# Patient Record
Sex: MALE | Race: AMERICAN INDIAN OR ALASKA NATIVE | NOT HISPANIC OR LATINO | Employment: OTHER | URBAN - METROPOLITAN AREA
[De-identification: names, ages, dates, MRNs, and addresses within clinical notes are randomized per-mention and may not be internally consistent; named-entity substitution may affect disease eponyms.]

---

## 2021-06-24 PROBLEM — N28.9 ACUTE ON CHRONIC RENAL INSUFFICIENCY: Status: ACTIVE | Noted: 2021-06-24

## 2021-06-24 PROBLEM — E11.9 TYPE 2 DIABETES MELLITUS (HCC): Status: ACTIVE | Noted: 2021-06-24

## 2021-06-24 PROBLEM — N18.9 ACUTE ON CHRONIC RENAL INSUFFICIENCY: Status: ACTIVE | Noted: 2021-06-24

## 2021-06-24 PROBLEM — I10 ESSENTIAL HYPERTENSION: Status: ACTIVE | Noted: 2021-06-24

## 2021-06-24 PROBLEM — I63.9 CVA (CEREBRAL VASCULAR ACCIDENT) (HCC): Status: ACTIVE | Noted: 2021-06-24

## 2021-06-28 PROBLEM — N18.9 ACUTE ON CHRONIC RENAL INSUFFICIENCY: Status: RESOLVED | Noted: 2021-06-24 | Resolved: 2021-06-28

## 2021-06-28 PROBLEM — I63.9 CVA (CEREBRAL VASCULAR ACCIDENT) (HCC): Status: RESOLVED | Noted: 2021-06-24 | Resolved: 2021-06-28

## 2021-06-28 PROBLEM — N28.9 ACUTE ON CHRONIC RENAL INSUFFICIENCY: Status: RESOLVED | Noted: 2021-06-24 | Resolved: 2021-06-28

## 2022-03-06 PROBLEM — N18.32 STAGE 3B CHRONIC KIDNEY DISEASE: Status: ACTIVE | Noted: 2022-03-06

## 2022-03-06 PROBLEM — R32 URINARY INCONTINENCE: Status: ACTIVE | Noted: 2022-03-06

## 2022-03-06 PROBLEM — D64.9 NORMOCYTIC ANEMIA: Status: ACTIVE | Noted: 2022-03-06

## 2022-03-06 PROBLEM — N17.9 ACUTE KIDNEY INJURY SUPERIMPOSED ON CKD (HCC): Status: ACTIVE | Noted: 2022-03-06

## 2022-03-06 PROBLEM — E87.29 HIGH ANION GAP METABOLIC ACIDOSIS: Status: ACTIVE | Noted: 2022-03-06

## 2022-03-06 PROBLEM — A41.9 SEPSIS (HCC): Status: ACTIVE | Noted: 2022-03-06

## 2022-03-06 PROBLEM — I69.30 HISTORY OF STROKE WITH RESIDUAL DEFICIT: Status: ACTIVE | Noted: 2022-03-06

## 2022-03-06 PROBLEM — R74.01 TRANSAMINITIS: Status: ACTIVE | Noted: 2022-03-06

## 2022-03-06 PROBLEM — N18.9 ACUTE KIDNEY INJURY SUPERIMPOSED ON CKD (HCC): Status: ACTIVE | Noted: 2022-03-06

## 2022-03-06 PROBLEM — E86.0 DEHYDRATION: Status: ACTIVE | Noted: 2022-03-06

## 2022-03-06 PROBLEM — N39.0 COMPLICATED URINARY TRACT INFECTION: Status: ACTIVE | Noted: 2022-03-06

## 2022-03-08 PROBLEM — N18.30 ANEMIA OF CHRONIC KIDNEY FAILURE, STAGE 3 (MODERATE): Status: ACTIVE | Noted: 2022-03-06

## 2022-03-08 PROBLEM — N18.32 TYPE 2 DIABETES MELLITUS WITH STAGE 3B CHRONIC KIDNEY DISEASE, WITHOUT LONG-TERM CURRENT USE OF INSULIN (HCC): Status: ACTIVE | Noted: 2021-06-24

## 2022-03-08 PROBLEM — N17.9 SEPSIS WITH ACUTE RENAL FAILURE WITHOUT SEPTIC SHOCK (HCC): Status: ACTIVE | Noted: 2022-03-06

## 2022-03-08 PROBLEM — D63.1 ANEMIA OF CHRONIC KIDNEY FAILURE, STAGE 3 (MODERATE): Status: ACTIVE | Noted: 2022-03-06

## 2022-03-08 PROBLEM — R65.20 SEPSIS WITH ACUTE RENAL FAILURE WITHOUT SEPTIC SHOCK (HCC): Status: ACTIVE | Noted: 2022-03-06

## 2022-03-08 PROBLEM — N12 PYELONEPHRITIS: Status: ACTIVE | Noted: 2022-03-06

## 2022-03-08 PROBLEM — N40.1 BENIGN LOCALIZED PROSTATIC HYPERPLASIA WITH LOWER URINARY TRACT SYMPTOMS (LUTS): Status: ACTIVE | Noted: 2022-03-06

## 2022-03-08 PROBLEM — E11.22 TYPE 2 DIABETES MELLITUS WITH STAGE 3B CHRONIC KIDNEY DISEASE, WITHOUT LONG-TERM CURRENT USE OF INSULIN (HCC): Status: ACTIVE | Noted: 2021-06-24

## 2022-03-11 PROBLEM — R74.01 TRANSAMINITIS: Status: RESOLVED | Noted: 2022-03-06 | Resolved: 2022-03-11

## 2022-03-11 PROBLEM — E87.29 HIGH ANION GAP METABOLIC ACIDOSIS: Status: RESOLVED | Noted: 2022-03-06 | Resolved: 2022-03-11

## 2022-03-11 PROBLEM — R65.20 SEPSIS WITH ACUTE RENAL FAILURE WITHOUT SEPTIC SHOCK (HCC): Status: RESOLVED | Noted: 2022-03-06 | Resolved: 2022-03-11

## 2022-03-11 PROBLEM — N12 PYELONEPHRITIS: Status: RESOLVED | Noted: 2022-03-06 | Resolved: 2022-03-11

## 2022-03-11 PROBLEM — A41.9 SEPSIS WITH ACUTE RENAL FAILURE WITHOUT SEPTIC SHOCK (HCC): Status: RESOLVED | Noted: 2022-03-06 | Resolved: 2022-03-11

## 2022-03-11 PROBLEM — N17.9 SEPSIS WITH ACUTE RENAL FAILURE WITHOUT SEPTIC SHOCK (HCC): Status: RESOLVED | Noted: 2022-03-06 | Resolved: 2022-03-11

## 2022-07-12 ENCOUNTER — PRE-ADMISSION TESTING (OUTPATIENT)
Dept: ADMISSIONS | Facility: MEDICAL CENTER | Age: 72
End: 2022-07-12
Attending: STUDENT IN AN ORGANIZED HEALTH CARE EDUCATION/TRAINING PROGRAM
Payer: MEDICARE

## 2022-07-12 DIAGNOSIS — Z01.812 PRE-OPERATIVE LABORATORY EXAMINATION: ICD-10-CM

## 2022-07-12 RX ORDER — CALCIUM ACETATE 667 MG/1
1334 CAPSULE ORAL DAILY
COMMUNITY
Start: 2022-06-17

## 2022-07-12 RX ORDER — PRAVASTATIN SODIUM 10 MG
40 TABLET ORAL DAILY
COMMUNITY
Start: 2022-06-16 | End: 2022-07-12

## 2022-07-12 RX ORDER — FUROSEMIDE 40 MG/1
40 TABLET ORAL DAILY
COMMUNITY
Start: 2022-05-25 | End: 2023-11-10

## 2022-07-12 RX ORDER — LOSARTAN POTASSIUM 50 MG/1
TABLET ORAL
COMMUNITY
Start: 2022-06-14 | End: 2022-07-12

## 2022-07-12 RX ORDER — BLOOD-GLUCOSE METER
KIT MISCELLANEOUS
COMMUNITY
Start: 2022-06-16 | End: 2023-11-10

## 2022-07-12 RX ORDER — MAGNESIUM OXIDE 400 MG/1
TABLET ORAL
COMMUNITY
Start: 2022-06-14 | End: 2023-11-10

## 2022-07-12 RX ORDER — ASPIRIN 81 MG/1
81 TABLET, CHEWABLE ORAL DAILY
Status: ON HOLD | COMMUNITY
Start: 2022-06-14 | End: 2023-11-11

## 2022-07-12 RX ORDER — INSULIN GLARGINE 100 [IU]/ML
10 INJECTION, SOLUTION SUBCUTANEOUS DAILY
COMMUNITY
Start: 2022-06-16 | End: 2023-11-10

## 2022-07-12 RX ORDER — ACETAMINOPHEN 160 MG
4000 TABLET,DISINTEGRATING ORAL DAILY
COMMUNITY
Start: 2022-06-14

## 2022-07-12 NOTE — OR NURSING
"Preadmit appointment: \" Preparing for your Procedure information\" sheet given to patient with verbal and written instructions. Patient instructed to continue prescribed medications through the day before surgery, instructed to take the following medications the day of surgery per anesthesia protocol: TYLENOL, PLENDIL, PROSCAR, SYTHROID, ANTIVERT, ZOFRAN.              Pt appt for labs 07/13/22 and written procedure instructions put in an envelope to give to him at this time along with the meds to take DOS. These are attached to the lab requisition sheet.    Fall Risk ordered and protocol initiated.  "

## 2022-07-13 ENCOUNTER — APPOINTMENT (OUTPATIENT)
Dept: ADMISSIONS | Facility: MEDICAL CENTER | Age: 72
End: 2022-07-13
Attending: STUDENT IN AN ORGANIZED HEALTH CARE EDUCATION/TRAINING PROGRAM
Payer: MEDICARE

## 2022-07-13 DIAGNOSIS — Z01.812 PRE-OPERATIVE LABORATORY EXAMINATION: ICD-10-CM

## 2022-07-13 LAB
ERYTHROCYTE [DISTWIDTH] IN BLOOD BY AUTOMATED COUNT: 42 FL (ref 35.9–50)
HCT VFR BLD AUTO: 32.5 % (ref 42–52)
HGB BLD-MCNC: 10.2 G/DL (ref 14–18)
INR PPP: 1.05 (ref 0.87–1.13)
MCH RBC QN AUTO: 30.4 PG (ref 27–33)
MCHC RBC AUTO-ENTMCNC: 31.4 G/DL (ref 33.7–35.3)
MCV RBC AUTO: 96.7 FL (ref 81.4–97.8)
PLATELET # BLD AUTO: 269 K/UL (ref 164–446)
PMV BLD AUTO: 9.6 FL (ref 9–12.9)
PROTHROMBIN TIME: 13.3 SEC (ref 12–14.6)
RBC # BLD AUTO: 3.36 M/UL (ref 4.7–6.1)
WBC # BLD AUTO: 9.8 K/UL (ref 4.8–10.8)

## 2022-07-13 PROCEDURE — 36415 COLL VENOUS BLD VENIPUNCTURE: CPT

## 2022-07-13 PROCEDURE — 85610 PROTHROMBIN TIME: CPT

## 2022-07-13 PROCEDURE — 85027 COMPLETE CBC AUTOMATED: CPT

## 2022-07-20 ENCOUNTER — APPOINTMENT (OUTPATIENT)
Dept: RADIOLOGY | Facility: MEDICAL CENTER | Age: 72
End: 2022-07-20
Attending: NURSE PRACTITIONER
Payer: MEDICARE

## 2022-07-20 ENCOUNTER — HOSPITAL ENCOUNTER (OUTPATIENT)
Facility: MEDICAL CENTER | Age: 72
End: 2022-07-20
Attending: STUDENT IN AN ORGANIZED HEALTH CARE EDUCATION/TRAINING PROGRAM | Admitting: STUDENT IN AN ORGANIZED HEALTH CARE EDUCATION/TRAINING PROGRAM
Payer: MEDICARE

## 2022-07-20 VITALS
RESPIRATION RATE: 16 BRPM | DIASTOLIC BLOOD PRESSURE: 75 MMHG | OXYGEN SATURATION: 97 % | WEIGHT: 199.52 LBS | BODY MASS INDEX: 29.46 KG/M2 | SYSTOLIC BLOOD PRESSURE: 147 MMHG | TEMPERATURE: 98.1 F | HEART RATE: 98 BPM

## 2022-07-20 DIAGNOSIS — N17.9 ACUTE RENAL FAILURE SUPERIMPOSED ON STAGE 4 CHRONIC KIDNEY DISEASE, UNSPECIFIED ACUTE RENAL FAILURE TYPE (HCC): ICD-10-CM

## 2022-07-20 DIAGNOSIS — N18.4 ACUTE RENAL FAILURE SUPERIMPOSED ON STAGE 4 CHRONIC KIDNEY DISEASE, UNSPECIFIED ACUTE RENAL FAILURE TYPE (HCC): ICD-10-CM

## 2022-07-20 LAB — CYTOLOGY REG CYTOL: NORMAL

## 2022-07-20 PROCEDURE — 700111 HCHG RX REV CODE 636 W/ 250 OVERRIDE (IP)

## 2022-07-20 PROCEDURE — 88346 IMFLUOR 1ST 1ANTB STAIN PX: CPT

## 2022-07-20 PROCEDURE — 88348 ELECTRON MICROSCOPY DX: CPT

## 2022-07-20 PROCEDURE — 4410208 CT-NEEDLE BX-RENAL

## 2022-07-20 PROCEDURE — 77012 CT SCAN FOR NEEDLE BIOPSY: CPT

## 2022-07-20 PROCEDURE — 700105 HCHG RX REV CODE 258: Performed by: RADIOLOGY

## 2022-07-20 PROCEDURE — 160002 HCHG RECOVERY MINUTES (STAT)

## 2022-07-20 PROCEDURE — 160046 HCHG PACU - 1ST 60 MINS PHASE II

## 2022-07-20 PROCEDURE — 160035 HCHG PACU - 1ST 60 MINS PHASE I

## 2022-07-20 PROCEDURE — 88313 SPECIAL STAINS GROUP 2: CPT | Mod: 91

## 2022-07-20 PROCEDURE — 88300 SURGICAL PATH GROSS: CPT

## 2022-07-20 PROCEDURE — 88305 TISSUE EXAM BY PATHOLOGIST: CPT

## 2022-07-20 PROCEDURE — 160036 HCHG PACU - EA ADDL 30 MINS PHASE I

## 2022-07-20 PROCEDURE — 88350 IMFLUOR EA ADDL 1ANTB STN PX: CPT | Mod: 91

## 2022-07-20 RX ORDER — MIDAZOLAM HYDROCHLORIDE 1 MG/ML
INJECTION INTRAMUSCULAR; INTRAVENOUS
Status: COMPLETED
Start: 2022-07-20 | End: 2022-07-20

## 2022-07-20 RX ORDER — LIDOCAINE HYDROCHLORIDE 10 MG/ML
INJECTION, SOLUTION INFILTRATION; PERINEURAL
Status: DISCONTINUED
Start: 2022-07-20 | End: 2022-07-20 | Stop reason: HOSPADM

## 2022-07-20 RX ORDER — ONDANSETRON 2 MG/ML
4 INJECTION INTRAMUSCULAR; INTRAVENOUS EVERY 6 HOURS PRN
Status: DISCONTINUED | OUTPATIENT
Start: 2022-07-20 | End: 2022-07-20 | Stop reason: HOSPADM

## 2022-07-20 RX ORDER — OXYCODONE HYDROCHLORIDE 5 MG/1
5 TABLET ORAL
Status: DISCONTINUED | OUTPATIENT
Start: 2022-07-20 | End: 2022-07-20 | Stop reason: HOSPADM

## 2022-07-20 RX ORDER — HYDROMORPHONE HYDROCHLORIDE 2 MG/ML
0.5 INJECTION, SOLUTION INTRAMUSCULAR; INTRAVENOUS; SUBCUTANEOUS
Status: DISCONTINUED | OUTPATIENT
Start: 2022-07-20 | End: 2022-07-20 | Stop reason: HOSPADM

## 2022-07-20 RX ORDER — SODIUM CHLORIDE 9 MG/ML
500 INJECTION, SOLUTION INTRAVENOUS
Status: DISCONTINUED | OUTPATIENT
Start: 2022-07-20 | End: 2022-07-20 | Stop reason: HOSPADM

## 2022-07-20 RX ORDER — SODIUM CHLORIDE 9 MG/ML
1000 INJECTION, SOLUTION INTRAVENOUS
Status: COMPLETED | OUTPATIENT
Start: 2022-07-20 | End: 2022-07-20

## 2022-07-20 RX ADMIN — FENTANYL CITRATE 50 MCG: 50 INJECTION, SOLUTION INTRAMUSCULAR; INTRAVENOUS at 14:39

## 2022-07-20 RX ADMIN — SODIUM CHLORIDE 1000 ML: 9 INJECTION, SOLUTION INTRAVENOUS at 13:07

## 2022-07-20 RX ADMIN — MIDAZOLAM HYDROCHLORIDE 1 MG: 1 INJECTION, SOLUTION INTRAMUSCULAR; INTRAVENOUS at 14:39

## 2022-07-20 ASSESSMENT — FIBROSIS 4 INDEX
FIB4 SCORE: 1.77
FIB4 SCORE: 1.77

## 2022-07-20 ASSESSMENT — PAIN DESCRIPTION - PAIN TYPE: TYPE: ACUTE PAIN

## 2022-07-20 NOTE — PROCEDURES
Patient underwent a CT guided left sided renal biopsy  by Dr. Rios. Procedure site was marked by MD and verified using imaging guidance.  Patient was placed in a prone position.  Vitals were taken every 5 minutes and remained stable during procedure (see doc flow sheet for results).  CO2 waveform capnography was monitored and remained consistent throughout procedure.  A bandaid dressing was placed over surgical site. Report called to PACU RN. Pt transported by timo with RN to PACU. Core Labs X 2 hand delivered to lab by Dr Armstrong, pathologist.

## 2022-07-20 NOTE — OR NURSING
1500: To PACU from IR via reshmarmarisol, pt awake, respirations spontaneous and non-labored. Pt denies nausea and pain. Biopsy site visualized, band-aid over area, c/d/i. Plan to keep for 2 hours per protocol for observation post procedure.  1515: No change in biopsy site. Pt resting, denies pain and nausea.  1530: No change.  1545: Pt resting, no change in biopsy site. Pt tolerating sips of water.  1600: No change.   1615: No change.  1630: No change in biopsy site, pt resting.  1645: No change.  1700: No change in biopsy site, pt denies pain and nausea. Meets criteria to transfer to Stage 2. Report called to Tete HEAD.

## 2022-07-20 NOTE — OR SURGEON
Immediate Post- Operative Note        Findings: renal insufficiency      Procedure(s): CT left kidney biopsy      Estimated Blood Loss: Less than 5 ml        Complications: None            7/20/2022     2:45 PM     Henry Rios M.D.

## 2022-07-20 NOTE — DISCHARGE INSTRUCTIONS
If any questions arise, call your provider.  If your provider is not available, please feel free to call the Surgical Center at [phone].    MEDICATIONS: Resume taking daily medication.  Take prescribed pain medication with food.  If no medication is prescribed, you may take non-aspirin pain medication if needed.  PAIN MEDICATION CAN BE VERY CONSTIPATING.  Take a stool softener or laxative such as senokot, pericolace, or milk of magnesia if needed.    What to Expect Post Anesthesia    Rest and take it easy for the first 24 hours.  A responsible adult is recommended to remain with you during that time.  It is normal to feel sleepy.  We encourage you to not do anything that requires balance, judgment or coordination.    FOR 24 HOURS DO NOT:  Drive, operate machinery or run household appliances.  Drink beer or alcoholic beverages.  Make important decisions or sign legal documents.    To avoid nausea, slowly advance diet as tolerated, avoiding spicy or greasy foods for the first day.  Add more substantial food to your diet according to your provider's instructions.  Babies can be fed formula or breast milk as soon as they are hungry.  INCREASE FLUIDS AND FIBER TO AVOID CONSTIPATION.    MILD FLU-LIKE SYMPTOMS ARE NORMAL.  YOU MAY EXPERIENCE GENERALIZED MUSCLE ACHES, THROAT IRRITATION, HEADACHE AND/OR SOME NAUSEA.    Percutaneous Kidney Biopsy    A kidney biopsy is a procedure to remove small pieces of tissue from a kidney. In a percutaneous biopsy, the tissue is removed using a needle that is inserted through the skin. This procedure is done so that the tissue can be examined under a microscope and checked for disease or infection.    What are the risks?  Generally, this is a safe procedure. However, problems may occur, including:  Infection.  Bleeding.  Allergic reactions to medicines.  Damage to other structures or organs.  Swelling from a collection of clotted blood outside a blood vessel (hematoma).  Blood in  the urine (hematuria).    What happens during the procedure?  To lower your risk of infection:  Your health care team will wash or sanitize their hands.  Your skin will be washed with soap.  An IV tube will be inserted into one of your veins.  You will be given one or more of the following:  A medicine to help you relax (sedative).  A medicine to numb the area (local anesthetic).  You will lie on your abdomen. A firm pillow will be placed under your body to help push the kidneys closer to the surface of the skin. If you have a transplanted kidney, you will lie on your back.  The health care provider will link the area where the needle will enter your skin.  An imaging test--such as an ultrasound, X-ray, CT scan, or MRI--will be used to locate the kidney. These images will also help the health care provider to guide the biopsy needle into the kidney.  You will be asked to hold your breath and stay still while the health care provider inserts the needle and removes the kidney tissue.  You will need to hold your breath and stay still for 30-45 seconds.  During the biopsy, you may hear a popping sound from the needle.  You may also feel some pressure from the area where the needle is being inserted.  The needle may be inserted and removed 3 or 4 times to make sure that enough tissue is taken for testing.  A bandage (dressing) may be placed over the spot where the needle entered your skin (biopsy site).  The procedure may vary among health care providers and hospitals.    What happens after the procedure?  Your blood pressure, heart rate, breathing rate, and blood oxygen level will be monitored until the medicines you were given have worn off.  You will need to lie on your back for 6-8 hours.  You may have some pain or soreness near the biopsy site.  You may have pink or cloudy urine from small amounts of blood. This is normal.  You may have grogginess or fatigue if you were given a sedative.  Do not drive for 24 hours  if you were given a sedative.  It is up to you to get the results of your procedure. Ask your health care provider, or the department performing the procedure, when your results will be ready.  This information is not intended to replace advice given to you by your health care provider. Make sure you discuss any questions you have with your health care provider.

## 2022-07-21 NOTE — OR NURSING
1700: Patient arrived to stage 2 after receiving report.     1705: Patient dressed with help of CNA. Now resting in recliner. Alert and oriented x4. Denies pain or nausea. Band aid in place over biopsy site. Band aid CDI. Patients wife brought into stage 2.    1720: Discharge instructions reviewed with patient and patients wife. Both verbalize understanding. PIV removed. All belongings returned to patient. Discharged home into care of responsible adult.

## 2023-03-16 ENCOUNTER — OFFICE VISIT (OUTPATIENT)
Dept: VASCULAR SURGERY | Facility: MEDICAL CENTER | Age: 73
End: 2023-03-16
Payer: MEDICARE

## 2023-03-16 VITALS
HEART RATE: 89 BPM | HEIGHT: 69 IN | DIASTOLIC BLOOD PRESSURE: 66 MMHG | SYSTOLIC BLOOD PRESSURE: 124 MMHG | WEIGHT: 191 LBS | TEMPERATURE: 99.2 F | OXYGEN SATURATION: 90 % | BODY MASS INDEX: 28.29 KG/M2

## 2023-03-16 DIAGNOSIS — N18.6 ESRD (END STAGE RENAL DISEASE) (HCC): ICD-10-CM

## 2023-03-16 PROCEDURE — 99203 OFFICE O/P NEW LOW 30 MIN: CPT | Performed by: SURGERY

## 2023-03-16 ASSESSMENT — FIBROSIS 4 INDEX: FIB4 SCORE: 1.12

## 2023-03-16 NOTE — H&P
Vascular Surgery            New Patient Consultation    Patient:Sai Lorenzo Jr.  MRN:[id number]  Primary care physician:Bernie Asher M.D.  Referring Provider: [facility]    Vascular Consultant: Rashad Dent MD    Date: 3/16/2023  _____________________________________________________    Chief Complaint:     Chief Complaint   Patient presents with    New Patient     fistla     Evaluate patient for new fistula creation    History of Present Illness:   Sai Lorenzo  is a 72 y.o. year old male who suffers from end-stage renal disease and is on maintenance hemodialysis.  The patient is in need of permanent access in the patient's room was referred for a natural fistula.  The patient is currently dialyzing through a PermCath in his right jugular vein.    Past Medical History:     Past Medical History:   Diagnosis Date    Anemia     Cataract     CVA (cerebral vascular accident) ([facility])     2009    Dental disorder     Full set dentures    Diabetes ([facility])     Fx     face jaw hand    Hashimoto's disease     Hashimoto's thyroiditis     High cholesterol     Hypertension     Macular degeneration     bilateral    Renal disorder     stage 3b     Past Surgical History:     Past Surgical History:   Procedure Laterality Date    CATARACT EXTRACTION WITH IOL      FACIAL FRACTURE ORIF      OTHER      facial reconstruction     Allergies:   No Known Allergies  Medications:     Outpatient Encounter Medications as of 3/16/2023   Medication Sig Dispense Refill    ASPIRIN LOW DOSE 81 MG EC tablet TAKE 1 TABLET BY MOUTH ONCE A DAY AS DIRECTED FOR CIRCULATION      calcium acetate (PHOS-LO) 667 MG Cap       Cholecalciferol (VITAMIN D3) 2000 UNIT Cap TAKE 2 CAPSULES BY MOUTH ONCE A DAY AS DIRECTED (ISIDORO POWERS--[facility])      furosemide (LASIX) 40 MG Tab Take 40 mg by mouth every day.      FREESTYLE LITE strip USE 1 TEST STRIP IN BLOOD GLUCOSE MONITOR 2 TO 3 TIMES A DAY AS DIRECTED TO  CHECK BLOOD SUGAR      LANTUS 100 UNIT/ML Solution Inject 10 Units under the skin every day.      magnesium oxide (MAG-OX) 400 MG Tab tablet TAKE 1 TABLET BY MOUTH AT BEDTIME AS DIRECTED FOR MAGNESIUM (RUSS POWERS[facility])      acetaminophen (TYLENOL) 500 MG Tab Take 2 Tablets by mouth 3 times a day as needed for Mild Pain.      finasteride (PROSCAR) 5 MG Tab Take 1 Tablet by mouth every day. 30 Tablet 0    ondansetron (ZOFRAN ODT) 4 MG TABLET DISPERSIBLE Take 1 Tablet by mouth every four hours as needed for Nausea (give PO if IV route is unavailable.). 30 Tablet 0    tamsulosin (FLOMAX) 0.4 MG capsule Take 1 Capsule by mouth 1/2 hour after breakfast. 30 Capsule 0    Multiple Vitamins-Minerals (PRESERVISION AREDS) Cap Take 1 Capsule by mouth 2 times a day.      felodipine ER (PLENDIL) 2.5 MG TABLET SR 24 HR Take 2.5 mg by mouth every day.      pravastatin (PRAVACHOL) 40 MG tablet Take 40 mg by mouth every evening.      levothyroxine (SYNTHROID) 75 MCG Tab Take 50 mcg by mouth Every morning on an empty stomach.      linagliptin (TRADJENTA) 5 MG Tab tablet Take 5 mg by mouth every day.      meclizine (ANTIVERT) 25 MG Tab Take 1 Tab by mouth 3 times a day as needed for Dizziness. 30 Tab 0     No facility-administered encounter medications on file as of 3/16/2023.     Social History:     Social History     Socioeconomic History    Marital status:      Spouse name: Not on file    Number of children: Not on file    Years of education: Not on file    Highest education level: Not on file   Occupational History    Not on file   Tobacco Use    Smoking status: Never    Smokeless tobacco: Former   Vaping Use    Vaping Use: Never used   Substance and Sexual Activity    Alcohol use: No    Drug use: No    Sexual activity: Not on file   Other Topics Concern    Not on file   Social History Narrative    Not on file     Social Determinants of Health     Financial Resource Strain: Not on file   Food  "Insecurity: Not on file   Transportation Needs: Not on file   Physical Activity: Not on file   Stress: Not on file   Social Connections: Not on file   Intimate Partner Violence: Not on file   Housing Stability: Not on file      Social History     Tobacco Use   Smoking Status Never   Smokeless Tobacco Former     Social History     Substance and Sexual Activity   Alcohol Use No     Social History     Substance and Sexual Activity   Drug Use No      Family History:     Family History   Problem Relation Age of Onset    Alcohol abuse Mother     Thyroid Mother     Alcohol abuse Father        Review of Systems:   Constitutional: Negative for fever or chills  HENT:   Negative for hearing loss or tinnitus    Eyes:    Negative for blurred vision or loss of vision  Respiratory:  Negative for cough or hemoptysis  Cardiac:  Negative for chest pain or palpitations  Vascular:  Negative for claudication, Negative for rest pain  Gastrointestinal: Negative for vomiting or abdominal pain     Negative for hematochezia or melena   Genitourinary: Negative for dysuria or hematuria   Musculoskeletal: Negative for myalgias or acute joint pain  Skin:   Negative for itching or rash  Neurological:  Negative for dizziness or headaches     Negative for speech disturbance     Negative for extremity weakness or paresthesias  Endo/Heme:  Negative for easy bruising or bleeding       Exam:   /66 (BP Location: Left arm, Patient Position: Sitting, BP Cuff Size: Adult)   Pulse 89   Temp 37.3 °C (99.2 °F) (Temporal)   Ht 1.753 m (5' 9\")   Wt 86.6 kg (191 lb)   SpO2 90%   BMI 28.21 kg/m²     Constitutional: Alert, oriented, no acute distress  HEENT:  Normocephalic and atraumatic, EOMI  Neck:   Supple, no JVD,   Cardiovascular: Regular rate and rhythm,   Pulmonary:  Good air entry bilaterally,    Abdominal:  Soft, non-tender, non-distended         Musculoskeletal: No tenderness, no deformity  Neurological:  CN II-XII grossly intact, no focal " deficits  Skin:   Skin is warm and dry. No rash noted.  Vascular exam: PermCath right jugular vein       Imaging:         Assessment and Plan:   -End-stage renal disease    Plan AV fistula left upper extremity possible right    I discussed the procedure in detail including the risk of steal and or thrombosis.  We discussed the challenges with dialysis access and the patient agrees to proceed.         _____________________________________________________  Rashad Dent MD  [facility]  [phone]  [address], [place] [address]

## 2023-03-20 ENCOUNTER — TELEPHONE (OUTPATIENT)
Dept: VASCULAR SURGERY | Facility: MEDICAL CENTER | Age: 73
End: 2023-03-20
Payer: MEDICARE

## 2023-03-20 NOTE — TELEPHONE ENCOUNTER
I called and left a message for patient to call back to schedule procedure w/ Dr. Dent.     3/24-I called and left a message for patient to call back to schedule procedure w/ Dr. Dent.

## 2023-03-27 ENCOUNTER — TELEPHONE (OUTPATIENT)
Dept: VASCULAR SURGERY | Facility: MEDICAL CENTER | Age: 73
End: 2023-03-27
Payer: MEDICARE

## 2023-03-27 NOTE — TELEPHONE ENCOUNTER
I called patient to schedule procedure w/ Dr. Dent. Patient stated that he would call me back to schedule.

## 2023-03-28 ENCOUNTER — TELEPHONE (OUTPATIENT)
Dept: VASCULAR SURGERY | Facility: MEDICAL CENTER | Age: 73
End: 2023-03-28
Payer: MEDICARE

## 2023-03-28 NOTE — TELEPHONE ENCOUNTER
I called and scheduled patient for procedure w/ Dr. Dent on 4/17. Patient is to check in @ 12:30 in the [email or web address] tower. Procedure time is @ 2:30 pm.

## 2023-03-30 ENCOUNTER — APPOINTMENT (OUTPATIENT)
Dept: ADMISSIONS | Facility: MEDICAL CENTER | Age: 73
End: 2023-03-30
Attending: SURGERY
Payer: MEDICARE

## 2023-04-03 ENCOUNTER — PRE-ADMISSION TESTING (OUTPATIENT)
Dept: ADMISSIONS | Facility: MEDICAL CENTER | Age: 73
End: 2023-04-03
Attending: SURGERY
Payer: MEDICARE

## 2023-04-03 RX ORDER — PREDNISOLONE ACETATE 10 MG/ML
SUSPENSION/ DROPS OPHTHALMIC
COMMUNITY
Start: 2023-03-31 | End: 2023-11-10

## 2023-04-03 RX ORDER — CALCITRIOL 0.5 UG/1
CAPSULE, LIQUID FILLED ORAL DAILY
COMMUNITY
Start: 2023-02-27 | End: 2023-11-10

## 2023-04-03 RX ORDER — SODIUM BICARBONATE 650 MG/1
TABLET ORAL
COMMUNITY
Start: 2023-03-15 | End: 2023-11-10

## 2023-04-03 RX ORDER — AMLODIPINE BESYLATE 5 MG/1
5 TABLET ORAL DAILY
COMMUNITY
Start: 2023-02-07 | End: 2023-11-10

## 2023-04-03 RX ORDER — APIXABAN 2.5 MG/1
2.5 TABLET, FILM COATED ORAL 2 TIMES DAILY
COMMUNITY
Start: 2023-02-07 | End: 2023-11-10

## 2023-04-14 ENCOUNTER — TELEPHONE (OUTPATIENT)
Dept: VASCULAR SURGERY | Facility: MEDICAL CENTER | Age: 73
End: 2023-04-14
Payer: MEDICARE

## 2023-04-14 NOTE — TELEPHONE ENCOUNTER
I called and confirmed upcoming procedure with Dr. Dent with patient. Patient is aware of location, date and check in time.

## 2023-04-17 ENCOUNTER — HOSPITAL ENCOUNTER (OUTPATIENT)
Facility: MEDICAL CENTER | Age: 73
End: 2023-04-17
Attending: SURGERY | Admitting: SURGERY
Payer: MEDICARE

## 2023-04-17 VITALS
HEART RATE: 94 BPM | DIASTOLIC BLOOD PRESSURE: 104 MMHG | OXYGEN SATURATION: 95 % | SYSTOLIC BLOOD PRESSURE: 178 MMHG | TEMPERATURE: 96.8 F | RESPIRATION RATE: 18 BRPM

## 2023-04-17 LAB
ANION GAP SERPL CALC-SCNC: 14 MMOL/L (ref 7–16)
BUN SERPL-MCNC: 65 MG/DL (ref 8–22)
CALCIUM SERPL-MCNC: 9.4 MG/DL (ref 8.5–10.5)
CHLORIDE SERPL-SCNC: 102 MMOL/L (ref 96–112)
CO2 SERPL-SCNC: 23 MMOL/L (ref 20–33)
CREAT SERPL-MCNC: 8.12 MG/DL (ref 0.5–1.4)
EKG IMPRESSION: NORMAL
ERYTHROCYTE [DISTWIDTH] IN BLOOD BY AUTOMATED COUNT: 45.8 FL (ref 35.9–50)
GFR SERPLBLD CREATININE-BSD FMLA CKD-EPI: 6 ML/MIN/1.73 M 2
GLUCOSE BLD STRIP.AUTO-MCNC: 153 MG/DL (ref 65–99)
GLUCOSE SERPL-MCNC: 169 MG/DL (ref 65–99)
HCT VFR BLD AUTO: 36.6 % (ref 42–52)
HGB BLD-MCNC: 12.1 G/DL (ref 14–18)
MCH RBC QN AUTO: 31.2 PG (ref 27–33)
MCHC RBC AUTO-ENTMCNC: 33.1 G/DL (ref 33.7–35.3)
MCV RBC AUTO: 94.3 FL (ref 81.4–97.8)
PLATELET # BLD AUTO: 211 K/UL (ref 164–446)
PMV BLD AUTO: 9.1 FL (ref 9–12.9)
POTASSIUM SERPL-SCNC: 4.8 MMOL/L (ref 3.6–5.5)
RBC # BLD AUTO: 3.88 M/UL (ref 4.7–6.1)
SODIUM SERPL-SCNC: 139 MMOL/L (ref 135–145)
WBC # BLD AUTO: 10.5 K/UL (ref 4.8–10.8)

## 2023-04-17 PROCEDURE — 93005 ELECTROCARDIOGRAM TRACING: CPT | Performed by: SURGERY

## 2023-04-17 PROCEDURE — 85027 COMPLETE CBC AUTOMATED: CPT

## 2023-04-17 PROCEDURE — 80048 BASIC METABOLIC PNL TOTAL CA: CPT

## 2023-04-17 PROCEDURE — 93010 ELECTROCARDIOGRAM REPORT: CPT | Performed by: INTERNAL MEDICINE

## 2023-04-17 PROCEDURE — 82962 GLUCOSE BLOOD TEST: CPT

## 2023-04-17 PROCEDURE — 36415 COLL VENOUS BLD VENIPUNCTURE: CPT

## 2023-04-17 RX ORDER — SODIUM CHLORIDE, SODIUM LACTATE, POTASSIUM CHLORIDE, CALCIUM CHLORIDE 600; 310; 30; 20 MG/100ML; MG/100ML; MG/100ML; MG/100ML
INJECTION, SOLUTION INTRAVENOUS CONTINUOUS
Status: DISCONTINUED | OUTPATIENT
Start: 2023-04-17 | End: 2023-04-17 | Stop reason: HOSPADM

## 2023-04-18 ENCOUNTER — TELEPHONE (OUTPATIENT)
Dept: VASCULAR SURGERY | Facility: MEDICAL CENTER | Age: 73
End: 2023-04-18
Payer: MEDICARE

## 2023-04-18 NOTE — TELEPHONE ENCOUNTER
I called and rescheduled patient for procedure with Dr. Dent on 6/05 in the [facility] tower. I confirmed location, date and time with patient and his spouse.     5/01-I called and rescheduled patient for procedure with Dr. Dent to 5/10 @ 1:00 pm. Patient is to check in @ 11:00 am in the [facility] tower.     Updated orders faxed to the OR.     No auth required per Medicaid website.

## 2023-04-21 ENCOUNTER — APPOINTMENT (OUTPATIENT)
Dept: ADMISSIONS | Facility: MEDICAL CENTER | Age: 73
End: 2023-04-21
Attending: SURGERY
Payer: MEDICARE

## 2023-04-28 ENCOUNTER — PRE-ADMISSION TESTING (OUTPATIENT)
Dept: ADMISSIONS | Facility: MEDICAL CENTER | Age: 73
End: 2023-04-28
Attending: SURGERY
Payer: MEDICARE

## 2023-04-28 NOTE — OR NURSING
At PAT appointment done over the phone patient unaware of medications and dosages.  He stated he would get them from his dialysis team and bring a list to pre admission when he visits his wife who is in the hospital.

## 2023-05-01 NOTE — OR NURSING
Call made to patient.  Patient reports that he will bring medication list to preadmit department tomorrow when he visits his wife in the hospital.

## 2023-05-03 NOTE — OR NURSING
Call made to patient.  Patient had friend Wm give list of medications to this nurse.  Gave instructions to Wm.

## 2023-05-05 ENCOUNTER — TELEPHONE (OUTPATIENT)
Dept: VASCULAR SURGERY | Facility: MEDICAL CENTER | Age: 73
End: 2023-05-05
Payer: MEDICARE

## 2023-05-05 NOTE — TELEPHONE ENCOUNTER
I called patient and left a message to confirm date, time and location for procedure with Dr. Dent on 5/10.

## 2023-05-10 ENCOUNTER — ANESTHESIA (OUTPATIENT)
Dept: SURGERY | Facility: MEDICAL CENTER | Age: 73
End: 2023-05-10
Payer: MEDICARE

## 2023-05-10 ENCOUNTER — HOSPITAL ENCOUNTER (OUTPATIENT)
Facility: MEDICAL CENTER | Age: 73
End: 2023-05-10
Attending: SURGERY | Admitting: SURGERY
Payer: MEDICARE

## 2023-05-10 ENCOUNTER — ANESTHESIA EVENT (OUTPATIENT)
Dept: SURGERY | Facility: MEDICAL CENTER | Age: 73
End: 2023-05-10
Payer: MEDICARE

## 2023-05-10 VITALS
TEMPERATURE: 97.2 F | HEIGHT: 69 IN | DIASTOLIC BLOOD PRESSURE: 65 MMHG | OXYGEN SATURATION: 95 % | WEIGHT: 181.66 LBS | HEART RATE: 88 BPM | SYSTOLIC BLOOD PRESSURE: 124 MMHG | RESPIRATION RATE: 12 BRPM | BODY MASS INDEX: 26.91 KG/M2

## 2023-05-10 DIAGNOSIS — G89.18 POST-OP PAIN: ICD-10-CM

## 2023-05-10 LAB
BUN BLD-MCNC: 25 MG/DL (ref 8–22)
CA-I BLD ISE-SCNC: 1.07 MMOL/L (ref 1.1–1.3)
CHLORIDE BLD-SCNC: 95 MMOL/L (ref 96–112)
CO2 BLD-SCNC: 33 MMOL/L (ref 20–33)
CREAT BLD-MCNC: 5.3 MG/DL (ref 0.5–1.4)
GLUCOSE BLD STRIP.AUTO-MCNC: 139 MG/DL (ref 65–99)
GLUCOSE BLD STRIP.AUTO-MCNC: 149 MG/DL (ref 65–99)
GLUCOSE BLD-MCNC: 141 MG/DL (ref 65–99)
POTASSIUM BLD-SCNC: 4 MMOL/L (ref 3.6–5.5)
POTASSIUM SERPL-SCNC: 4 MMOL/L (ref 3.6–5.5)
SODIUM BLD-SCNC: 139 MMOL/L (ref 135–145)

## 2023-05-10 PROCEDURE — 160009 HCHG ANES TIME/MIN: Performed by: SURGERY

## 2023-05-10 PROCEDURE — 160046 HCHG PACU - 1ST 60 MINS PHASE II: Performed by: SURGERY

## 2023-05-10 PROCEDURE — 160002 HCHG RECOVERY MINUTES (STAT): Performed by: SURGERY

## 2023-05-10 PROCEDURE — 700102 HCHG RX REV CODE 250 W/ 637 OVERRIDE(OP): Mod: UD | Performed by: ANESTHESIOLOGY

## 2023-05-10 PROCEDURE — 160048 HCHG OR STATISTICAL LEVEL 1-5: Performed by: SURGERY

## 2023-05-10 PROCEDURE — 160028 HCHG SURGERY MINUTES - 1ST 30 MINS LEVEL 3: Performed by: SURGERY

## 2023-05-10 PROCEDURE — 36415 COLL VENOUS BLD VENIPUNCTURE: CPT

## 2023-05-10 PROCEDURE — 80047 BASIC METABLC PNL IONIZED CA: CPT

## 2023-05-10 PROCEDURE — 160035 HCHG PACU - 1ST 60 MINS PHASE I: Performed by: SURGERY

## 2023-05-10 PROCEDURE — 84132 ASSAY OF SERUM POTASSIUM: CPT

## 2023-05-10 PROCEDURE — 700111 HCHG RX REV CODE 636 W/ 250 OVERRIDE (IP): Mod: UD | Performed by: ANESTHESIOLOGY

## 2023-05-10 PROCEDURE — 160025 RECOVERY II MINUTES (STATS): Performed by: SURGERY

## 2023-05-10 PROCEDURE — 700105 HCHG RX REV CODE 258: Mod: UD | Performed by: SURGERY

## 2023-05-10 PROCEDURE — 36821 AV FUSION DIRECT ANY SITE: CPT | Mod: AS | Performed by: NURSE PRACTITIONER

## 2023-05-10 PROCEDURE — A9270 NON-COVERED ITEM OR SERVICE: HCPCS | Mod: UD | Performed by: ANESTHESIOLOGY

## 2023-05-10 PROCEDURE — 160039 HCHG SURGERY MINUTES - EA ADDL 1 MIN LEVEL 3: Performed by: SURGERY

## 2023-05-10 PROCEDURE — 700111 HCHG RX REV CODE 636 W/ 250 OVERRIDE (IP): Mod: UD | Performed by: SURGERY

## 2023-05-10 PROCEDURE — 82962 GLUCOSE BLOOD TEST: CPT

## 2023-05-10 PROCEDURE — 700101 HCHG RX REV CODE 250: Mod: UD | Performed by: ANESTHESIOLOGY

## 2023-05-10 PROCEDURE — 36821 AV FUSION DIRECT ANY SITE: CPT | Performed by: SURGERY

## 2023-05-10 PROCEDURE — 01844 ANES VASC SHUNT/SHUNT REVJ: CPT | Performed by: ANESTHESIOLOGY

## 2023-05-10 PROCEDURE — 99100 ANES PT EXTEME AGE<1 YR&>70: CPT | Performed by: ANESTHESIOLOGY

## 2023-05-10 RX ORDER — METOPROLOL TARTRATE 1 MG/ML
1 INJECTION, SOLUTION INTRAVENOUS
Status: DISCONTINUED | OUTPATIENT
Start: 2023-05-10 | End: 2023-05-10 | Stop reason: HOSPADM

## 2023-05-10 RX ORDER — CEFAZOLIN SODIUM 1 G/3ML
INJECTION, POWDER, FOR SOLUTION INTRAMUSCULAR; INTRAVENOUS PRN
Status: DISCONTINUED | OUTPATIENT
Start: 2023-05-10 | End: 2023-05-10 | Stop reason: SURG

## 2023-05-10 RX ORDER — IPRATROPIUM BROMIDE AND ALBUTEROL SULFATE 2.5; .5 MG/3ML; MG/3ML
3 SOLUTION RESPIRATORY (INHALATION)
Status: DISCONTINUED | OUTPATIENT
Start: 2023-05-10 | End: 2023-05-10 | Stop reason: HOSPADM

## 2023-05-10 RX ORDER — HYDROCODONE BITARTRATE AND ACETAMINOPHEN 5; 325 MG/1; MG/1
1-2 TABLET ORAL EVERY 4 HOURS PRN
Qty: 10 TABLET | Refills: 0 | Status: SHIPPED | OUTPATIENT
Start: 2023-05-10 | End: 2023-05-12

## 2023-05-10 RX ORDER — SODIUM CHLORIDE, SODIUM LACTATE, POTASSIUM CHLORIDE, CALCIUM CHLORIDE 600; 310; 30; 20 MG/100ML; MG/100ML; MG/100ML; MG/100ML
INJECTION, SOLUTION INTRAVENOUS CONTINUOUS
Status: DISCONTINUED | OUTPATIENT
Start: 2023-05-10 | End: 2023-05-10 | Stop reason: HOSPADM

## 2023-05-10 RX ORDER — HEPARIN SODIUM 1000 [USP'U]/ML
INJECTION, SOLUTION INTRAVENOUS; SUBCUTANEOUS PRN
Status: DISCONTINUED | OUTPATIENT
Start: 2023-05-10 | End: 2023-05-10 | Stop reason: SURG

## 2023-05-10 RX ORDER — VASOPRESSIN 20 U/ML
INJECTION PARENTERAL PRN
Status: DISCONTINUED | OUTPATIENT
Start: 2023-05-10 | End: 2023-05-10 | Stop reason: SURG

## 2023-05-10 RX ORDER — HYDROMORPHONE HYDROCHLORIDE 1 MG/ML
0.4 INJECTION, SOLUTION INTRAMUSCULAR; INTRAVENOUS; SUBCUTANEOUS
Status: DISCONTINUED | OUTPATIENT
Start: 2023-05-10 | End: 2023-05-10 | Stop reason: HOSPADM

## 2023-05-10 RX ORDER — OXYCODONE HYDROCHLORIDE AND ACETAMINOPHEN 5; 325 MG/1; MG/1
1 TABLET ORAL
Status: COMPLETED | OUTPATIENT
Start: 2023-05-10 | End: 2023-05-10

## 2023-05-10 RX ORDER — PHENYLEPHRINE HCL IN 0.9% NACL 0.5 MG/5ML
SYRINGE (ML) INTRAVENOUS PRN
Status: DISCONTINUED | OUTPATIENT
Start: 2023-05-10 | End: 2023-05-10 | Stop reason: SURG

## 2023-05-10 RX ORDER — ONDANSETRON 2 MG/ML
4 INJECTION INTRAMUSCULAR; INTRAVENOUS
Status: DISCONTINUED | OUTPATIENT
Start: 2023-05-10 | End: 2023-05-10 | Stop reason: HOSPADM

## 2023-05-10 RX ORDER — DIPHENHYDRAMINE HYDROCHLORIDE 50 MG/ML
12.5 INJECTION INTRAMUSCULAR; INTRAVENOUS
Status: DISCONTINUED | OUTPATIENT
Start: 2023-05-10 | End: 2023-05-10 | Stop reason: HOSPADM

## 2023-05-10 RX ORDER — HYDRALAZINE HYDROCHLORIDE 20 MG/ML
5 INJECTION INTRAMUSCULAR; INTRAVENOUS
Status: DISCONTINUED | OUTPATIENT
Start: 2023-05-10 | End: 2023-05-10 | Stop reason: HOSPADM

## 2023-05-10 RX ORDER — HYDROMORPHONE HYDROCHLORIDE 1 MG/ML
0.1 INJECTION, SOLUTION INTRAMUSCULAR; INTRAVENOUS; SUBCUTANEOUS
Status: DISCONTINUED | OUTPATIENT
Start: 2023-05-10 | End: 2023-05-10 | Stop reason: HOSPADM

## 2023-05-10 RX ORDER — HYDROMORPHONE HYDROCHLORIDE 1 MG/ML
0.2 INJECTION, SOLUTION INTRAMUSCULAR; INTRAVENOUS; SUBCUTANEOUS
Status: DISCONTINUED | OUTPATIENT
Start: 2023-05-10 | End: 2023-05-10 | Stop reason: HOSPADM

## 2023-05-10 RX ORDER — HALOPERIDOL 5 MG/ML
1 INJECTION INTRAMUSCULAR
Status: DISCONTINUED | OUTPATIENT
Start: 2023-05-10 | End: 2023-05-10 | Stop reason: HOSPADM

## 2023-05-10 RX ORDER — SODIUM CHLORIDE 9 MG/ML
INJECTION, SOLUTION INTRAVENOUS ONCE
Status: COMPLETED | OUTPATIENT
Start: 2023-05-10 | End: 2023-05-10

## 2023-05-10 RX ORDER — OXYCODONE HYDROCHLORIDE AND ACETAMINOPHEN 5; 325 MG/1; MG/1
2 TABLET ORAL
Status: COMPLETED | OUTPATIENT
Start: 2023-05-10 | End: 2023-05-10

## 2023-05-10 RX ORDER — LIDOCAINE HYDROCHLORIDE 20 MG/ML
INJECTION, SOLUTION EPIDURAL; INFILTRATION; INTRACAUDAL; PERINEURAL PRN
Status: DISCONTINUED | OUTPATIENT
Start: 2023-05-10 | End: 2023-05-10 | Stop reason: SURG

## 2023-05-10 RX ADMIN — FENTANYL CITRATE 25 MCG: 50 INJECTION INTRAMUSCULAR; INTRAVENOUS at 12:13

## 2023-05-10 RX ADMIN — OXYCODONE AND ACETAMINOPHEN 1 TABLET: 5; 325 TABLET ORAL at 13:48

## 2023-05-10 RX ADMIN — Medication 100 MCG: at 12:23

## 2023-05-10 RX ADMIN — HEPARIN SODIUM 3000 UNITS: 1000 INJECTION, SOLUTION INTRAVENOUS; SUBCUTANEOUS at 12:32

## 2023-05-10 RX ADMIN — CEFAZOLIN 2 G: 1 INJECTION, POWDER, FOR SOLUTION INTRAMUSCULAR; INTRAVENOUS at 12:15

## 2023-05-10 RX ADMIN — PROPOFOL 130 MG: 10 INJECTION, EMULSION INTRAVENOUS at 12:15

## 2023-05-10 RX ADMIN — LIDOCAINE HYDROCHLORIDE 80 MG: 20 INJECTION, SOLUTION EPIDURAL; INFILTRATION; INTRACAUDAL at 12:15

## 2023-05-10 RX ADMIN — FENTANYL CITRATE 25 MCG: 50 INJECTION INTRAMUSCULAR; INTRAVENOUS at 12:23

## 2023-05-10 RX ADMIN — Medication 200 MCG: at 12:33

## 2023-05-10 RX ADMIN — Medication 200 MCG: at 12:20

## 2023-05-10 RX ADMIN — PROPOFOL 40 MG: 10 INJECTION, EMULSION INTRAVENOUS at 12:31

## 2023-05-10 RX ADMIN — SODIUM CHLORIDE: 9 INJECTION, SOLUTION INTRAVENOUS at 12:08

## 2023-05-10 RX ADMIN — PROPOFOL 30 MG: 10 INJECTION, EMULSION INTRAVENOUS at 12:50

## 2023-05-10 RX ADMIN — Medication 200 MCG: at 12:28

## 2023-05-10 RX ADMIN — PROPOFOL 30 MG: 10 INJECTION, EMULSION INTRAVENOUS at 12:23

## 2023-05-10 RX ADMIN — VASOPRESSIN 2 UNITS: 20 INJECTION INTRAVENOUS at 12:36

## 2023-05-10 ASSESSMENT — PAIN SCALES - GENERAL: PAIN_LEVEL: 0

## 2023-05-10 ASSESSMENT — FIBROSIS 4 INDEX: FIB4 SCORE: 1.29

## 2023-05-10 ASSESSMENT — PAIN DESCRIPTION - PAIN TYPE
TYPE: SURGICAL PAIN

## 2023-05-10 NOTE — ANESTHESIA PREPROCEDURE EVALUATION
Case: [id number] Date/Time: 05/10/23 1245    Procedure: LEFT UPPER EXTREMITY ARTERIOVENOUS FISTULA CREATION, POSSIBLE RIGHT    Pre-op diagnosis: END STAGE RENAL DISEASE    Location: [facility] OR 09 / SURGERY [facility]    Surgeons: Rashad Dent M.D.        Past Medical History:   Diagnosis Date   • Anemia    • Cataract     iol bilat   • CVA (cerebral vascular accident) ([facility])     2009,2015- left sided weakness   • Dental disorder     Full set dentures   • Diabetes ([facility])    • Dialysis patient ([facility])    • Fx     face jaw hand   • Hashimoto's disease    • Hashimoto's thyroiditis    • High cholesterol    • Hypertension    • Macular degeneration     bilateral   • Renal disorder     stage 3b     Past Surgical History:   Procedure Laterality Date   • CATARACT EXTRACTION WITH IOL     • FACIAL FRACTURE ORIF     • OTHER      facial reconstruction     Current Outpatient Medications   Medication Instructions   • amLODIPine (NORVASC) 5 mg, DAILY   • ASPIRIN LOW DOSE 81 MG EC tablet TAKE 1 TABLET BY MOUTH ONCE A DAY AS DIRECTED FOR CIRCULATION   • calcitRIOL (ROCALTROL) 0.5 MCG Cap DAILY   • calcium acetate (PHOS-LO) 667 MG Cap No dose, route, or frequency recorded.   • Cholecalciferol (VITAMIN D3) 2000 UNIT Cap TAKE 2 CAPSULES BY MOUTH ONCE A DAY AS DIRECTED (ISIDORO POWERS[facility])   • Eliquis 2.5 mg, 2 TIMES DAILY   • felodipine ER (PLENDIL) 2.5 mg, DAILY   • finasteride (PROSCAR) 5 mg, Oral, DAILY   • FREESTYLE LITE strip USE 1 TEST STRIP IN BLOOD GLUCOSE MONITOR 2 TO 3 TIMES A DAY AS DIRECTED TO CHECK BLOOD SUGAR   • furosemide (LASIX) 40 mg, DAILY   • Lantus 10 Units, Subcutaneous, DAILY   • levothyroxine (SYNTHROID) 75 mcg, Oral, EACH MORNING ON EMPTY STOMACH   • linagliptin (TRADJENTA) 5 mg, Oral, DAILY   • magnesium oxide (MAG-OX) 400 MG Tab tablet    • meclizine (ANTIVERT) 25 mg, Oral, 3 TIMES DAILY PRN   • Multiple Vitamins-Minerals (PRESERVISION AREDS) Cap 1 Capsule, Oral, 2 TIMES DAILY   •  ondansetron (ZOFRAN ODT) 4 mg, Oral, EVERY 4 HOURS PRN   • pravastatin (PRAVACHOL) 40 mg, Oral, NIGHTLY   • prednisoLONE acetate (PRED FORTE) 1 % Suspension PLACE 1 DROP IN AFFECTED EYE(S) 4 TIMES A DAY AS DIRECTED   • sodium bicarbonate (SODIUM BICARBONATE) 650 MG Tab TAKE 1 TABLET BY MOUTH 2 TIMES A DAY AS DIRECTED (PRIYA MARTIN--[facility])   • tamsulosin (FLOMAX) 0.4 mg, Oral, AFTER BREAKFAST     Lab Results   Component Value Date/Time    SODIUM 139 04/17/2023 01:35 PM    POTASSIUM 4.8 04/17/2023 01:35 PM    CHLORIDE 102 04/17/2023 01:35 PM    CO2 23 04/17/2023 01:35 PM    GLUCOSE 169 (H) 04/17/2023 01:35 PM    BUN 65 (H) 04/17/2023 01:35 PM    CREATININE 8.12 (HH) 04/17/2023 01:35 PM    GLOMRATE 5 (L) 11/30/2022 07:05 AM      Lab Results   Component Value Date/Time    WBC 10.5 04/17/2023 01:35 PM    RBC 3.88 (L) 04/17/2023 01:35 PM    HEMOGLOBIN 12.1 (L) 04/17/2023 01:35 PM    HEMATOCRIT 36.6 (L) 04/17/2023 01:35 PM    MCV 94.3 04/17/2023 01:35 PM    MCH 31.2 04/17/2023 01:35 PM    MCHC 33.1 (L) 04/17/2023 01:35 PM    MPV 9.1 04/17/2023 01:35 PM    NEUTSPOLYS 59.2 11/28/2022 03:59 AM    NEUTSPOLYS 70 03/08/2022 04:30 AM    LYMPHOCYTES 22.8 11/28/2022 03:59 AM    LYMPHOCYTES 15.0 (L) 03/08/2022 04:30 AM    MONOCYTES 15.2 (H) 11/28/2022 03:59 AM    MONOCYTES 11.0 (H) 03/08/2022 04:30 AM    EOSINOPHILS 2.1 11/28/2022 03:59 AM    EOSINOPHILS 4.0 03/08/2022 04:30 AM    BASOPHILS 0.7 11/28/2022 03:59 AM          TTE 11/2022:  Interpretation Summary   Left ventricular systolic function is low normal.   The Ejection Fraction estimate is 50-55%   There is mild anterior and anterolateral hypokinesis. Mid to distal inferior and inferolateral   hypokinesis.   The right ventricle is normal size.   The right ventricular systolic function is normal.   The left atrium is moderately dilated.   There is mild mitral stenosis   Right ventricular systolic pressure is elevated at 55-60 mmHg   The  aortic root is mildly dilated   Small anteriorly accumulated pericardial effusion       Relevant Problems   CARDIAC   (positive) Essential hypertension         (positive) Acute kidney injury superimposed on CKD (HCC)   (positive) Anemia of chronic kidney failure, stage 3 (moderate) (HCC)   (positive) Stage 3b chronic kidney disease (HCC)      ENDO   (positive) Type 2 diabetes mellitus with stage 3b chronic kidney disease, without long-term current use of insulin (HCC)     Patient reports CVA residual effects including balance problems and mild L-sided weakness    Physical Exam    Airway   Mallampati: II  TM distance: >3 FB  Neck ROM: full       Cardiovascular - normal exam  Rhythm: regular  Rate: normal  (-) murmur     Dental   (+) lower dentures, upper dentures           Pulmonary - normal exam  (+) decreased breath sounds     Abdominal    Neurological - normal exam                 Anesthesia Plan    ASA 3   ASA physical status 3 criteria: ESRD undergoing regularly scheduled dialysis    Plan - general       Airway plan will be LMA          Induction: intravenous    Postoperative Plan: Postoperative administration of opioids is intended.    Pertinent diagnostic labs and testing reviewed    Informed Consent:    Anesthetic plan and risks discussed with patient.

## 2023-05-10 NOTE — ANESTHESIA TIME REPORT
Anesthesia Start and Stop Event Times     Date Time Event    5/10/2023 1152 Ready for Procedure     1208 Anesthesia Start     1303 Anesthesia Stop        Responsible Staff  05/10/23    Name Role Begin End    Laci Vaz M.D. Anesth 1208 1303        Overtime Reason:  no overtime (within assigned shift)    Comments:

## 2023-05-10 NOTE — ANESTHESIA PROCEDURE NOTES
Airway    Date/Time: 5/10/2023 12:16 PM    Performed by: Laci Vaz M.D.  Authorized by: Laci Vaz M.D.    Location:  OR  Urgency:  Elective  Indications for Airway Management:  Anesthesia      Spontaneous Ventilation: absent    Sedation Level:  Deep  Preoxygenated: Yes    Final Airway Type:  Supraglottic airway  Final Supraglottic Airway:  Standard LMA    SGA Size:  4  Number of Attempts at Approach:  1

## 2023-05-10 NOTE — OP REPORT
VASCULAR SURGERY SERVICE  Operative Note  __________________________________________________________    Date:  5/10/2023    Patient: Sai Lorenzo Jr.  :  1950  MRN:  [id number]  __________________________________________________________    Preoperative Diagnosis:  - End-stage renal disease    Postoperative Diagnosis:  - End-stage renal disease    Procedure:  - Creation of a left brachiobasilic AV fistula, first stage (63316)  __________________________________________________________    Surgeon:                                 Rashad Dent MD    Assistant:   Mandy POWERS    The nature of the surgical procedure warranted additional skilled operative assistance from an Advanced Registered Nurse Practitioner (ARNP). The assistant was present during the entire operation. The surgical assistant performed the following provided assistance with optimal surgical exposure of the operative field and provided high complexity, subspecialty decision making input.     Anesthesia:                             General plus local anesthetic    EBL:                                        minimal     Complications:                        none    Disposition:                             Tolerated well, sent to recovery in stable condition    Justification for use of Surgical First Assist:  An experienced first assistant was utilized during this operation due to the complexity of the operation.  My assistant participated with patient preparation for surgery, incision, surgical exposure including retraction, dissection, and ligation to isolate the target structures and preserve nearby structures, and closure of the field of dissection.  The presence of the expert assist increased the efficiency of the operation and decreased the risk of intraoperative surgical complications.    __________________________________________________________       DESCRIPTION OF PROCEDURE:  Following informed consent, patient was placed supine on  the operating table and general anesthesia was administered. Patient was prepped and draped in the usual sterile fashion.  Surgical time-out was called and correct.  Patient received preoperative antibiotics.       Patient was taken to the operating suite placed in the supine position.  Patient's left upper extremity was circumferentially prepped and draped in sterile fashion.  Transverse incision was made just distal to the antecubital crease.  The patient cephalic vein at this location was noted to be occluded but the basilic vein was adequate for for stage basilic vein transposition.  The brachial artery was dissected from the surrounding tissue and isolated.  The basilic vein was also isolated from the surrounding tissue transected distally.  3000 units of heparin was administered and a longitudinal arteriotomy was made in the brachial artery after clamping the vessel proximally and distally.  The vein was spatulated and end-to-side anastomosis was completed between the basilic vein and the brachial artery using 6-0 Prolene sutures in a running fashion.  Just prior to completing the anastomosis all vessels were flushed and the anastomosis was complete and flow was established.  3-0 Vicryl subcutaneous sutures were placed followed by 4-0 STRATAFIX suture benzoin Steri-Strips sterile dressings were applied.  Patient will return in 2 weeks for second stage.       All counts were correct.  Patient tolerated the procedure well and was sent to recovery in stable condition.     ____________________________________________________    Rashad Dent MD  [facility]

## 2023-05-10 NOTE — ANESTHESIA POSTPROCEDURE EVALUATION
Patient: Sai Lorenzo Jr.    Procedure Summary     Date: 05/10/23 Room / Location: [facility] 09 / SURGERY [facility]    Anesthesia Start: 1208 Anesthesia Stop: 1303    Procedure: LEFT UPPER EXTREMITY ARTERIOVENOUS FISTULA CREATION, POSSIBLE RIGHT (Left: Arm Lower) Diagnosis: (END STAGE RENAL DISEASE)    Surgeons: Rashad Dent M.D. Responsible Provider: Laci Vaz M.D.    Anesthesia Type: general ASA Status: 3          Final Anesthesia Type: general  Last vitals  BP   Blood Pressure : 101/58    Temp   36.2 °C (97.2 °F)    Pulse   72   Resp   13    SpO2   100 %      Anesthesia Post Evaluation    Patient location during evaluation: PACU  Patient participation: complete - patient participated  Level of consciousness: sleepy but conscious  Pain score: 0    Airway patency: patent  Anesthetic complications: no  Cardiovascular status: hemodynamically stable  Respiratory status: acceptable  Hydration status: balanced    PONV: none          There were no known notable events for this encounter.     Nurse Pain Score: 0 (NPRS)

## 2023-05-10 NOTE — H&P
Vascular Surgery            New Patient Consultation     Patient:Sai Lorenzo Jr.  MRN:[id number]  Primary care physician:Bernie Asher M.D.  Referring Provider: [facility]     Vascular Consultant: Rashad Dent MD     Date: 5-10-23  _____________________________________________________     Chief Complaint:           Chief Complaint   Patient presents with    New Patient       fistla      Evaluate patient for new fistula creation     History of Present Illness:   Sai Lorenzo  is a 72 y.o. year old male who suffers from end-stage renal disease and is on maintenance hemodialysis.  The patient is in need of permanent access in the patient's room was referred for a natural fistula.  The patient is currently dialyzing through a PermCath in his right jugular vein.     Past Medical History:      Past Medical History        Past Medical History:   Diagnosis Date    Anemia      Cataract      CVA (cerebral vascular accident) ([facility])       2009    Dental disorder       Full set dentures    Diabetes ([facility])      Fx       face jaw hand    Hashimoto's disease      Hashimoto's thyroiditis      High cholesterol      Hypertension      Macular degeneration       bilateral    Renal disorder       stage 3b         Past Surgical History:      Past Surgical History         Past Surgical History:   Procedure Laterality Date    CATARACT EXTRACTION WITH IOL        FACIAL FRACTURE ORIF        OTHER         facial reconstruction         Allergies:   No Known Allergies  Medications:      Encounter Medications          Outpatient Encounter Medications as of 3/16/2023   Medication Sig Dispense Refill    ASPIRIN LOW DOSE 81 MG EC tablet TAKE 1 TABLET BY MOUTH ONCE A DAY AS DIRECTED FOR CIRCULATION        calcium acetate (PHOS-LO) 667 MG Cap          Cholecalciferol (VITAMIN D3) 2000 UNIT Cap TAKE 2 CAPSULES BY MOUTH ONCE A DAY AS DIRECTED (ISIDORO POWERS--[facility])        furosemide (LASIX) 40 MG Tab Take 40 mg  by mouth every day.        FREESTYLE LITE strip USE 1 TEST STRIP IN BLOOD GLUCOSE MONITOR 2 TO 3 TIMES A DAY AS DIRECTED TO CHECK BLOOD SUGAR        LANTUS 100 UNIT/ML Solution Inject 10 Units under the skin every day.        magnesium oxide (MAG-OX) 400 MG Tab tablet TAKE 1 TABLET BY MOUTH AT BEDTIME AS DIRECTED FOR MAGNESIUM ([place] POWERS--[facility])        acetaminophen (TYLENOL) 500 MG Tab Take 2 Tablets by mouth 3 times a day as needed for Mild Pain.        finasteride (PROSCAR) 5 MG Tab Take 1 Tablet by mouth every day. 30 Tablet 0    ondansetron (ZOFRAN ODT) 4 MG TABLET DISPERSIBLE Take 1 Tablet by mouth every four hours as needed for Nausea (give PO if IV route is unavailable.). 30 Tablet 0    tamsulosin (FLOMAX) 0.4 MG capsule Take 1 Capsule by mouth 1/2 hour after breakfast. 30 Capsule 0    Multiple Vitamins-Minerals (PRESERVISION AREDS) Cap Take 1 Capsule by mouth 2 times a day.        felodipine ER (PLENDIL) 2.5 MG TABLET SR 24 HR Take 2.5 mg by mouth every day.        pravastatin (PRAVACHOL) 40 MG tablet Take 40 mg by mouth every evening.        levothyroxine (SYNTHROID) 75 MCG Tab Take 50 mcg by mouth Every morning on an empty stomach.        linagliptin (TRADJENTA) 5 MG Tab tablet Take 5 mg by mouth every day.        meclizine (ANTIVERT) 25 MG Tab Take 1 Tab by mouth 3 times a day as needed for Dizziness. 30 Tab 0      No facility-administered encounter medications on file as of 3/16/2023.         Social History:      Social History               Socioeconomic History    Marital status:        Spouse name: Not on file    Number of children: Not on file    Years of education: Not on file    Highest education level: Not on file   Occupational History    Not on file   Tobacco Use    Smoking status: Never    Smokeless tobacco: Former   Vaping Use    Vaping Use: Never used   Substance and Sexual Activity    Alcohol use: No    Drug use: No    Sexual activity: Not on file   Other Topics  Concern    Not on file   Social History Narrative    Not on file      Social Determinants of Health      Financial Resource Strain: Not on file   Food Insecurity: Not on file   Transportation Needs: Not on file   Physical Activity: Not on file   Stress: Not on file   Social Connections: Not on file   Intimate Partner Violence: Not on file   Housing Stability: Not on file         Social History          Tobacco Use   Smoking Status Never   Smokeless Tobacco Former      Social History          Substance and Sexual Activity   Alcohol Use No      Social History          Substance and Sexual Activity   Drug Use No      Family History:      Family History         Family History   Problem Relation Age of Onset    Alcohol abuse Mother      Thyroid Mother      Alcohol abuse Father              Review of Systems:   Constitutional:          Negative for fever or chills  HENT:                         Negative for hearing loss or tinnitus    Eyes:                           Negative for blurred vision or loss of vision  Respiratory:               Negative for cough or hemoptysis  Cardiac:                      Negative for chest pain or palpitations  Vascular:                    Negative for claudication, Negative for rest pain  Gastrointestinal:       Negative for vomiting or abdominal pain                                      Negative for hematochezia or melena   Genitourinary:           Negative for dysuria or hematuria   Musculoskeletal:       Negative for myalgias or acute joint pain  Skin:                           Negative for itching or rash  Neurological:             Negative for dizziness or headaches                                      Negative for speech disturbance                                      Negative for extremity weakness or paresthesias  Endo/Heme:               Negative for easy bruising or bleeding         Exam:   /66 (BP Location: Left arm, Patient Position: Sitting, BP Cuff Size: Adult)   Pulse  "89   Temp 37.3 °C (99.2 °F) (Temporal)   Ht 1.753 m (5' 9\")   Wt 86.6 kg (191 lb)   SpO2 90%   BMI 28.21 kg/m²      Constitutional:          Alert, oriented, no acute distress  HEENT:                       Normocephalic and atraumatic, EOMI  Neck:                          Supple, no JVD,   Cardiovascular:         Regular rate and rhythm,   Pulmonary:                Good air entry bilaterally,    Abdominal:                Soft, non-tender, non-distended                                          Musculoskeletal:       No tenderness, no deformity  Neurological:             CN II-XII grossly intact, no focal deficits  Skin:                           Skin is warm and dry. No rash noted.  Vascular exam:         PermCath right jugular vein            Imaging:            Assessment and Plan:   -End-stage renal disease     Plan AV fistula left upper extremity possible right     I discussed the procedure in detail including the risk of steal and or thrombosis.  We discussed the challenges with dialysis access and the patient agrees to proceed.           _____________________________________________________  Rashad Dent MD  [facility]  [phone]  [address], [place] [address]  "

## 2023-05-10 NOTE — OR NURSING
Pt's VSS; denies N/V; states pain is at tolerable level. Dressing CDI to left upper arm. D/c orders received. IV dc'd. Pt changed into clothing with assistance. Pt up and ambulated, steady gait. Discharge instructions given as well as pain management handout; pt and family verbalized understanding and questions answered. Patient states ready to d/c home. Prescriptions e-sent to preferred pharmacy. Pt dc'd in w/c with RN in stable condition.

## 2023-05-10 NOTE — OR NURSING
1405 patient recovered well in PACU, medicated per EMAR for pain, dressing to left upper arm with steri-strips, gauze and tegaderm; clean/dry/intact throughout PACU stay, patient denies nausea, tolerates PO fluids, telephone updates to sister, report called to phase II RN, Dr. Dent and Adele Dent APRN notified of need to change home medication to [facility] in [place], request complete per APRN. Patient agrees/asks questions regarding care. Safety ensured, care transferred.

## 2023-05-10 NOTE — DISCHARGE INSTRUCTIONS
AV Fistula D/C instructions:    * Remove ace wrap 2 hours post-procedure if present.    1. DIET: Upon discharge from the hospital you may resume your normal preoperative diet. Depending on how you are feeling and whether you have nausea or not, you may wish to stay with a bland diet for the first few days. However, you can advance this as quickly as you feel ready.    2. ACTIVITIES: After discharge from the hospital, you may resume full routine activities. However, there should be no heavy lifting (greater than 15 pounds) and no strenuous activities until after your follow-up visit. Otherwise, routine activities of daily living are acceptable.    3. DRIVING: You may drive whenever you are off pain medications and are able to perform the activities needed to drive, i.e. turning, bending, twisting, etc.    4. BATHING: You may get the wound wet at any time after leaving the hospital. You may shower, but do not submerge in a bath for at least a week. Dressings may come off after 48 hours. Leave steri strips on until they fall off.  It may be necessary to trim the edges.     5. BOWEL FUNCTION: Constipation is common after an operation, especially with pain medications. The combination of pain medication and decreased activity level can cause constipation in otherwise normal patients. If you feel this is occurring, take a laxative (Milk of Magnesia, Ex-Lax, Senokot, etc.) until the problem has resolved.    6. PAIN MEDICATION: You will be given a prescription for pain medication at discharge. Please take these as directed. It is important to remember not to take medications on an empty stomach as this may cause nausea.    7.CALL IF YOU HAVE: (1) Fevers to more than 1010 F, (2) Unusual chest or leg pain, (3) Drainage or fluid from incision that may be foul smelling, increased tenderness or soreness at the wound or the wound edges are no longer together, redness or swelling at the incision site. Please do not hesitate to  call with any other questions.     8. APPOINTMENT: Follow up [facility] as instructed.       HOME CARE INSTRUCTIONS    ACTIVITY: Rest and take it easy for the first 24 hours.  A responsible adult is recommended to remain with you during that time.  It is normal to feel sleepy.  We encourage you to not do anything that requires balance, judgment or coordination.    FOR 24 HOURS DO NOT:  Drive, operate machinery or run household appliances.  Drink beer or alcoholic beverages.  Make important decisions or sign legal documents.    SPECIAL INSTRUCTIONS:   AV Fistula Placement, Care After  This sheet gives you information about how to care for yourself after your procedure. Your health care provider may also give you more specific instructions. If you have problems or questions, contact your health care provider.  What can I expect after the procedure?  After the procedure, it is common to:  Feel sore.  Feel a vibration (thrill) over the fistula.  Follow these instructions at home:  Incision care  Follow instructions from your health care provider about how to take care of your incision. Make sure you:  Wash your hands with soap and water before and after you change your bandage (dressing). If soap and water are not available, use hand .  Change your dressing as told by your health care provider.  Leave stitches (sutures), skin glue, or adhesive strips in place. These skin closures may need to stay in place for 2 weeks or longer. If adhesive strip edges start to loosen and curl up, you may trim the loose edges. Do not remove adhesive strips completely unless your health care provider tells you to do that.  Fistula care  Check your fistula site every day to make sure the thrill feels the same.  Check your fistula site every day for signs of infection. Check for:  Redness, swelling, or pain.  Fluid or blood.  Warmth.  Pus or a bad smell.  Raise (elevate) the affected area above the level of your  heart while you are sitting or lying down.  Do not lift anything that is heavier than 10 lb (4.5 kg), or the limit that you are told, until your health care provider says that it is safe.  Do not lie down on your fistula arm.  Do not let anyone draw blood or take a blood pressure reading on your fistula arm. This is important.  Do not wear tight jewelry or clothing over your fistula arm.  Bathing  Do not take baths, swim, or use a hot tub until your health care provider approves. Ask your health care provider if you may take showers. You may only be allowed to take sponge baths.  Keep the area around your incision clean and dry.  Medicines  Take over-the-counter and prescription medicines only as told by your health care provider.  Ask your health care provider if any medicine prescribed to you can cause constipation. You may need to take steps to prevent or treat constipation, such as:  Drink enough fluid to keep your urine pale yellow.  Take over-the-counter or prescription medicines.  Eat foods that are high in fiber, such as beans, whole grains, and fresh fruits and vegetables.  Limit foods that are high in fat and processed sugars, such as fried or sweet foods.  General instructions  Rest at home for a day or two.  Return to your normal activities as told by your health care provider. Ask your health care provider what activities are safe for you.  Keep all follow-up visits as told by your health care provider. This is important.  Contact a health care provider if:  You have redness, swelling, or pain around your fistula site.  Your fistula site feels warm to the touch.  You have pus or a bad smell coming from your fistula site.  You have a fever.  You have numbness or coldness at your fistula site.  You feel a decrease or a change in the thrill.  Get help right away if you:  Are bleeding from your fistula site.  Have chest pain.  Have trouble breathing.  Summary  Follow instructions from your health care  provider about how to take care of your incision.  Do not let anyone draw blood or take a blood pressure reading on your fistula arm. This is important.  Return to your normal activities as told by your health care provider. Ask your health care provider what activities are safe for you.  Contact a health care provider if you have a change in the thrill or have any signs of infection at your fistula site.  Keep all follow-up visits as told by your health care provider. This is important.  This information is not intended to replace advice given to you by your health care provider. Make sure you discuss any questions you have with your health care provider.  Document Released: 12/18/2006 Document Revised: 06/24/2019 Document Reviewed: 06/24/2019  [employer] Patient Education © 2020 [employer]      DIET: To avoid nausea, slowly advance diet as tolerated, avoiding spicy or greasy foods for the first day.  Add more substantial food to your diet according to your physician's instructions.  Babies can be fed formula or breast milk as soon as they are hungry.  INCREASE FLUIDS AND FIBER TO AVOID CONSTIPATION.    SURGICAL DRESSING/BATHING: You may get the wound wet at any time after leaving the hospital. You may shower, but do not submerge in a bath for at least a week. Dressings may come off after 48 hours. Leave steri strips on until they fall off.  It may be necessary to trim the edges.     MEDICATIONS: Resume taking daily medication.  Take prescribed pain medication with food.  If no medication is prescribed, you may take non-aspirin pain medication if needed.  PAIN MEDICATION CAN BE VERY CONSTIPATING.  Take a stool softener or laxative such as senokot, pericolace, or milk of magnesia if needed.    Prescription given for Norco   Last pain medication given at 1:48 PM.    A follow-up appointment should be arranged with your doctor in 1-2 weeks ; call to schedule.    You should CALL YOUR PHYSICIAN if you develop:  Fever  greater than 101 degrees F.  Pain not relieved by medication, or persistent nausea or vomiting.  Excessive bleeding (blood soaking through dressing) or unexpected drainage from the wound.  Extreme redness or swelling around the incision site, drainage of pus or foul smelling drainage.  Inability to urinate or empty your bladder within 8 hours.  Problems with breathing or chest pain.    You should call 911 if you develop problems with breathing or chest pain.  If you are unable to contact your doctor or surgical center, you should go to the nearest emergency room or urgent care center.  Physician's telephone #: Dr Dent [phone]    MILD FLU-LIKE SYMPTOMS ARE NORMAL.  YOU MAY EXPERIENCE GENERALIZED MUSCLE ACHES, THROAT IRRITATION, HEADACHE AND/OR SOME NAUSEA.    If any questions arise, call your doctor.  If your doctor is not available, please feel free to call the Surgical Center at [phone].  The Center is open Monday through Friday from 7AM to 7PM.      A registered nurse may call you a few days after your surgery to see how you are doing after your procedure.    You may also receive a survey in the mail within the next two weeks and we ask that you take a few moments to complete the survey and return it to us.  Our goal is to provide you with very good care and we value your comments.     Depression / Suicide Risk    As you are discharged from this [facility] facility, it is important to learn how to keep safe from harming yourself.    Recognize the warning signs:  Abrupt changes in personality, positive or negative- including increase in energy   Giving away possessions  Change in eating patterns- significant weight changes-  positive or negative  Change in sleeping patterns- unable to sleep or sleeping all the time   Unwillingness or inability to communicate  Depression  Unusual sadness, discouragement and loneliness  Talk of wanting to die  Neglect of personal appearance   Rebelliousness- reckless  behavior  Withdrawal from people/activities they love  Confusion- inability to concentrate     If you or a loved one observes any of these behaviors or has concerns about self-harm, here's what you can do:  Talk about it- your feelings and reasons for harming yourself  Remove any means that you might use to hurt yourself (examples: pills, rope, extension cords, firearm)  Get professional help from the community (Mental Health, Substance Abuse, psychological counseling)  Do not be alone:Call your Safe Contact- someone whom you trust who will be there for you.  Call your local CRISIS HOTLINE 642-8325 or 463-088-3296  Call your local Children's Mobile Crisis Response Team [place] (093) 857-4820 or www.[employer]  Call the toll free National Suicide Prevention Hotlines   National Suicide Prevention Lifeline 203-393-SBYA (8176)  National Hope Line Network 800-SUICIDE (496-0652)    I acknowledge receipt and understanding of these Home Care instructions.

## 2023-07-03 ENCOUNTER — TELEPHONE (OUTPATIENT)
Dept: VASCULAR SURGERY | Facility: MEDICAL CENTER | Age: 73
End: 2023-07-03
Payer: MEDICARE

## 2023-11-10 ENCOUNTER — APPOINTMENT (OUTPATIENT)
Dept: CARDIOLOGY | Facility: MEDICAL CENTER | Age: 73
DRG: 321 | End: 2023-11-10
Attending: INTERNAL MEDICINE
Payer: MEDICARE

## 2023-11-10 ENCOUNTER — HOSPITAL ENCOUNTER (INPATIENT)
Facility: MEDICAL CENTER | Age: 73
LOS: 1 days | DRG: 321 | End: 2023-11-11
Attending: EMERGENCY MEDICINE | Admitting: INTERNAL MEDICINE
Payer: MEDICARE

## 2023-11-10 DIAGNOSIS — I21.4 NSTEMI (NON-ST ELEVATED MYOCARDIAL INFARCTION) (HCC): ICD-10-CM

## 2023-11-10 LAB
ACT BLD: 217 SEC (ref 74–137)
ACT BLD: 244 SEC (ref 74–137)
ALBUMIN SERPL BCP-MCNC: 4.1 G/DL (ref 3.2–4.9)
ALBUMIN/GLOB SERPL: 1.2 G/DL
ALP SERPL-CCNC: 98 U/L (ref 30–99)
ALT SERPL-CCNC: 12 U/L (ref 2–50)
ANION GAP SERPL CALC-SCNC: 15 MMOL/L (ref 7–16)
AST SERPL-CCNC: 43 U/L (ref 12–45)
BASOPHILS # BLD AUTO: 0.8 % (ref 0–1.8)
BASOPHILS # BLD: 0.09 K/UL (ref 0–0.12)
BILIRUB SERPL-MCNC: 0.4 MG/DL (ref 0.1–1.5)
BUN SERPL-MCNC: 44 MG/DL (ref 8–22)
CALCIUM ALBUM COR SERPL-MCNC: 8.9 MG/DL (ref 8.5–10.5)
CALCIUM SERPL-MCNC: 9 MG/DL (ref 8.5–10.5)
CHLORIDE SERPL-SCNC: 102 MMOL/L (ref 96–112)
CO2 SERPL-SCNC: 25 MMOL/L (ref 20–33)
CREAT SERPL-MCNC: 7.96 MG/DL (ref 0.5–1.4)
EKG IMPRESSION: NORMAL
EOSINOPHIL # BLD AUTO: 0.5 K/UL (ref 0–0.51)
EOSINOPHIL NFR BLD: 4.4 % (ref 0–6.9)
ERYTHROCYTE [DISTWIDTH] IN BLOOD BY AUTOMATED COUNT: 45 FL (ref 35.9–50)
GFR SERPLBLD CREATININE-BSD FMLA CKD-EPI: 6 ML/MIN/1.73 M 2
GLOBULIN SER CALC-MCNC: 3.3 G/DL (ref 1.9–3.5)
GLUCOSE SERPL-MCNC: 93 MG/DL (ref 65–99)
HCT VFR BLD AUTO: 34.8 % (ref 42–52)
HGB BLD-MCNC: 11.1 G/DL (ref 14–18)
IMM GRANULOCYTES # BLD AUTO: 0.07 K/UL (ref 0–0.11)
IMM GRANULOCYTES NFR BLD AUTO: 0.6 % (ref 0–0.9)
LYMPHOCYTES # BLD AUTO: 3.31 K/UL (ref 1–4.8)
LYMPHOCYTES NFR BLD: 29 % (ref 22–41)
MCH RBC QN AUTO: 33.3 PG (ref 27–33)
MCHC RBC AUTO-ENTMCNC: 31.9 G/DL (ref 32.3–36.5)
MCV RBC AUTO: 104.5 FL (ref 81.4–97.8)
MONOCYTES # BLD AUTO: 1.08 K/UL (ref 0–0.85)
MONOCYTES NFR BLD AUTO: 9.5 % (ref 0–13.4)
NEUTROPHILS # BLD AUTO: 6.37 K/UL (ref 1.82–7.42)
NEUTROPHILS NFR BLD: 55.7 % (ref 44–72)
NRBC # BLD AUTO: 0 K/UL
NRBC BLD-RTO: 0 /100 WBC (ref 0–0.2)
NT-PROBNP SERPL IA-MCNC: ABNORMAL PG/ML (ref 0–125)
PLATELET # BLD AUTO: 214 K/UL (ref 164–446)
PMV BLD AUTO: 9.6 FL (ref 9–12.9)
POTASSIUM SERPL-SCNC: 5.3 MMOL/L (ref 3.6–5.5)
PROT SERPL-MCNC: 7.4 G/DL (ref 6–8.2)
RBC # BLD AUTO: 3.33 M/UL (ref 4.7–6.1)
SODIUM SERPL-SCNC: 142 MMOL/L (ref 135–145)
TROPONIN T SERPL-MCNC: 710 NG/L (ref 6–19)
WBC # BLD AUTO: 11.4 K/UL (ref 4.8–10.8)

## 2023-11-10 PROCEDURE — 92928 PRQ TCAT PLMT NTRAC ST 1 LES: CPT | Mod: LD | Performed by: INTERNAL MEDICINE

## 2023-11-10 PROCEDURE — A9270 NON-COVERED ITEM OR SERVICE: HCPCS

## 2023-11-10 PROCEDURE — 36415 COLL VENOUS BLD VENIPUNCTURE: CPT

## 2023-11-10 PROCEDURE — B2111ZZ FLUOROSCOPY OF MULTIPLE CORONARY ARTERIES USING LOW OSMOLAR CONTRAST: ICD-10-PCS | Performed by: INTERNAL MEDICINE

## 2023-11-10 PROCEDURE — 96372 THER/PROPH/DIAG INJ SC/IM: CPT

## 2023-11-10 PROCEDURE — B2151ZZ FLUOROSCOPY OF LEFT HEART USING LOW OSMOLAR CONTRAST: ICD-10-PCS | Performed by: INTERNAL MEDICINE

## 2023-11-10 PROCEDURE — 700102 HCHG RX REV CODE 250 W/ 637 OVERRIDE(OP): Performed by: INTERNAL MEDICINE

## 2023-11-10 PROCEDURE — 85347 COAGULATION TIME ACTIVATED: CPT | Mod: 91

## 2023-11-10 PROCEDURE — 99153 MOD SED SAME PHYS/QHP EA: CPT

## 2023-11-10 PROCEDURE — 84484 ASSAY OF TROPONIN QUANT: CPT | Mod: 91

## 2023-11-10 PROCEDURE — 99223 1ST HOSP IP/OBS HIGH 75: CPT | Mod: AI | Performed by: INTERNAL MEDICINE

## 2023-11-10 PROCEDURE — A9270 NON-COVERED ITEM OR SERVICE: HCPCS | Performed by: INTERNAL MEDICINE

## 2023-11-10 PROCEDURE — 700101 HCHG RX REV CODE 250: Mod: UD

## 2023-11-10 PROCEDURE — 4A023N7 MEASUREMENT OF CARDIAC SAMPLING AND PRESSURE, LEFT HEART, PERCUTANEOUS APPROACH: ICD-10-PCS | Performed by: INTERNAL MEDICINE

## 2023-11-10 PROCEDURE — 700117 HCHG RX CONTRAST REV CODE 255: Performed by: INTERNAL MEDICINE

## 2023-11-10 PROCEDURE — 99291 CRITICAL CARE FIRST HOUR: CPT

## 2023-11-10 PROCEDURE — 93005 ELECTROCARDIOGRAM TRACING: CPT | Performed by: INTERNAL MEDICINE

## 2023-11-10 PROCEDURE — 93458 L HRT ARTERY/VENTRICLE ANGIO: CPT | Mod: 26,59 | Performed by: INTERNAL MEDICINE

## 2023-11-10 PROCEDURE — 99152 MOD SED SAME PHYS/QHP 5/>YRS: CPT | Performed by: INTERNAL MEDICINE

## 2023-11-10 PROCEDURE — 83880 ASSAY OF NATRIURETIC PEPTIDE: CPT

## 2023-11-10 PROCEDURE — 700111 HCHG RX REV CODE 636 W/ 250 OVERRIDE (IP): Mod: UD | Performed by: EMERGENCY MEDICINE

## 2023-11-10 PROCEDURE — 80053 COMPREHEN METABOLIC PANEL: CPT

## 2023-11-10 PROCEDURE — 700111 HCHG RX REV CODE 636 W/ 250 OVERRIDE (IP): Mod: JZ,UD

## 2023-11-10 PROCEDURE — 93005 ELECTROCARDIOGRAM TRACING: CPT | Performed by: EMERGENCY MEDICINE

## 2023-11-10 PROCEDURE — 85025 COMPLETE CBC W/AUTO DIFF WBC: CPT | Mod: 91

## 2023-11-10 PROCEDURE — 027034Z DILATION OF CORONARY ARTERY, ONE ARTERY WITH DRUG-ELUTING INTRALUMINAL DEVICE, PERCUTANEOUS APPROACH: ICD-10-PCS | Performed by: INTERNAL MEDICINE

## 2023-11-10 PROCEDURE — 99223 1ST HOSP IP/OBS HIGH 75: CPT | Mod: 25 | Performed by: INTERNAL MEDICINE

## 2023-11-10 PROCEDURE — 770020 HCHG ROOM/CARE - TELE (206)

## 2023-11-10 PROCEDURE — 700102 HCHG RX REV CODE 250 W/ 637 OVERRIDE(OP)

## 2023-11-10 RX ORDER — POLYETHYLENE GLYCOL 3350 17 G/17G
1 POWDER, FOR SOLUTION ORAL
Status: DISCONTINUED | OUTPATIENT
Start: 2023-11-10 | End: 2023-11-11 | Stop reason: HOSPADM

## 2023-11-10 RX ORDER — NITROGLYCERIN 0.4 MG/1
0.4 TABLET SUBLINGUAL
Status: DISCONTINUED | OUTPATIENT
Start: 2023-11-10 | End: 2023-11-11 | Stop reason: HOSPADM

## 2023-11-10 RX ORDER — ASPIRIN 81 MG/1
81 TABLET, CHEWABLE ORAL DAILY
Status: DISCONTINUED | OUTPATIENT
Start: 2023-11-11 | End: 2023-11-10

## 2023-11-10 RX ORDER — AMOXICILLIN 250 MG
2 CAPSULE ORAL 2 TIMES DAILY
Status: DISCONTINUED | OUTPATIENT
Start: 2023-11-10 | End: 2023-11-11 | Stop reason: HOSPADM

## 2023-11-10 RX ORDER — LEVOTHYROXINE SODIUM 0.07 MG/1
75 TABLET ORAL
Status: DISCONTINUED | OUTPATIENT
Start: 2023-11-11 | End: 2023-11-11 | Stop reason: HOSPADM

## 2023-11-10 RX ORDER — ONDANSETRON 4 MG/1
4 TABLET, ORALLY DISINTEGRATING ORAL EVERY 4 HOURS PRN
Status: DISCONTINUED | OUTPATIENT
Start: 2023-11-10 | End: 2023-11-11 | Stop reason: HOSPADM

## 2023-11-10 RX ORDER — ATORVASTATIN CALCIUM 80 MG/1
80 TABLET, FILM COATED ORAL EVERY EVENING
Status: DISCONTINUED | OUTPATIENT
Start: 2023-11-10 | End: 2023-11-10

## 2023-11-10 RX ORDER — HYDROMORPHONE HYDROCHLORIDE 1 MG/ML
0.5 INJECTION, SOLUTION INTRAMUSCULAR; INTRAVENOUS; SUBCUTANEOUS
Status: DISCONTINUED | OUTPATIENT
Start: 2023-11-10 | End: 2023-11-11 | Stop reason: HOSPADM

## 2023-11-10 RX ORDER — OXYCODONE HYDROCHLORIDE 5 MG/1
5 TABLET ORAL
Status: DISCONTINUED | OUTPATIENT
Start: 2023-11-10 | End: 2023-11-11 | Stop reason: HOSPADM

## 2023-11-10 RX ORDER — PHENYLEPHRINE HCL IN 0.9% NACL 0.5 MG/5ML
SYRINGE (ML) INTRAVENOUS
Status: COMPLETED
Start: 2023-11-10 | End: 2023-11-10

## 2023-11-10 RX ORDER — LABETALOL HYDROCHLORIDE 5 MG/ML
10 INJECTION, SOLUTION INTRAVENOUS EVERY 4 HOURS PRN
Status: DISCONTINUED | OUTPATIENT
Start: 2023-11-10 | End: 2023-11-11 | Stop reason: HOSPADM

## 2023-11-10 RX ORDER — ASPIRIN 81 MG/1
81 TABLET ORAL DAILY
Status: DISCONTINUED | OUTPATIENT
Start: 2023-11-11 | End: 2023-11-11

## 2023-11-10 RX ORDER — OXYCODONE HYDROCHLORIDE 10 MG/1
10 TABLET ORAL
Status: DISCONTINUED | OUTPATIENT
Start: 2023-11-10 | End: 2023-11-11 | Stop reason: HOSPADM

## 2023-11-10 RX ORDER — ACETAMINOPHEN 325 MG/1
650 TABLET ORAL EVERY 6 HOURS PRN
Status: DISCONTINUED | OUTPATIENT
Start: 2023-11-10 | End: 2023-11-11 | Stop reason: HOSPADM

## 2023-11-10 RX ORDER — VERAPAMIL HYDROCHLORIDE 2.5 MG/ML
INJECTION, SOLUTION INTRAVENOUS
Status: COMPLETED
Start: 2023-11-10 | End: 2023-11-10

## 2023-11-10 RX ORDER — HEPARIN SODIUM 1000 [USP'U]/ML
2000 INJECTION, SOLUTION INTRAVENOUS; SUBCUTANEOUS PRN
Status: DISCONTINUED | OUTPATIENT
Start: 2023-11-10 | End: 2023-11-11 | Stop reason: HOSPADM

## 2023-11-10 RX ORDER — CLOPIDOGREL BISULFATE 75 MG/1
75 TABLET ORAL DAILY
Status: DISCONTINUED | OUTPATIENT
Start: 2023-11-11 | End: 2023-11-11 | Stop reason: HOSPADM

## 2023-11-10 RX ORDER — HEPARIN SODIUM 1000 [USP'U]/ML
2000 INJECTION, SOLUTION INTRAVENOUS; SUBCUTANEOUS PRN
Status: DISCONTINUED | OUTPATIENT
Start: 2023-11-10 | End: 2023-11-10

## 2023-11-10 RX ORDER — HEPARIN SODIUM 1000 [USP'U]/ML
INJECTION, SOLUTION INTRAVENOUS; SUBCUTANEOUS
Status: COMPLETED
Start: 2023-11-10 | End: 2023-11-10

## 2023-11-10 RX ORDER — ASPIRIN 81 MG/1
81 TABLET ORAL DAILY
Status: DISCONTINUED | OUTPATIENT
Start: 2023-11-11 | End: 2023-11-10

## 2023-11-10 RX ORDER — ATORVASTATIN CALCIUM 80 MG/1
80 TABLET, FILM COATED ORAL EVERY EVENING
Status: DISCONTINUED | OUTPATIENT
Start: 2023-11-10 | End: 2023-11-11 | Stop reason: HOSPADM

## 2023-11-10 RX ORDER — MIDAZOLAM HYDROCHLORIDE 1 MG/ML
INJECTION INTRAMUSCULAR; INTRAVENOUS
Status: COMPLETED
Start: 2023-11-10 | End: 2023-11-10

## 2023-11-10 RX ORDER — CLOPIDOGREL 300 MG/1
TABLET, FILM COATED ORAL
Status: COMPLETED
Start: 2023-11-10 | End: 2023-11-10

## 2023-11-10 RX ORDER — LIDOCAINE HYDROCHLORIDE 20 MG/ML
INJECTION, SOLUTION INFILTRATION; PERINEURAL
Status: COMPLETED
Start: 2023-11-10 | End: 2023-11-10

## 2023-11-10 RX ORDER — BISACODYL 10 MG
10 SUPPOSITORY, RECTAL RECTAL
Status: DISCONTINUED | OUTPATIENT
Start: 2023-11-10 | End: 2023-11-11 | Stop reason: HOSPADM

## 2023-11-10 RX ORDER — CARVEDILOL 6.25 MG/1
6.25 TABLET ORAL 2 TIMES DAILY WITH MEALS
Status: DISCONTINUED | OUTPATIENT
Start: 2023-11-10 | End: 2023-11-11 | Stop reason: HOSPADM

## 2023-11-10 RX ORDER — ONDANSETRON 2 MG/ML
4 INJECTION INTRAMUSCULAR; INTRAVENOUS EVERY 4 HOURS PRN
Status: DISCONTINUED | OUTPATIENT
Start: 2023-11-10 | End: 2023-11-11 | Stop reason: HOSPADM

## 2023-11-10 RX ORDER — HEPARIN SODIUM 5000 [USP'U]/100ML
0-30 INJECTION, SOLUTION INTRAVENOUS CONTINUOUS
Status: DISCONTINUED | OUTPATIENT
Start: 2023-11-10 | End: 2023-11-10

## 2023-11-10 RX ORDER — TAMSULOSIN HYDROCHLORIDE 0.4 MG/1
0.4 CAPSULE ORAL
Status: DISCONTINUED | OUTPATIENT
Start: 2023-11-11 | End: 2023-11-11 | Stop reason: HOSPADM

## 2023-11-10 RX ORDER — CALCIUM ACETATE 667 MG/1
1334 TABLET ORAL DAILY
Status: DISCONTINUED | OUTPATIENT
Start: 2023-11-11 | End: 2023-11-11 | Stop reason: HOSPADM

## 2023-11-10 RX ORDER — HEPARIN SODIUM 200 [USP'U]/100ML
INJECTION, SOLUTION INTRAVENOUS
Status: COMPLETED
Start: 2023-11-10 | End: 2023-11-10

## 2023-11-10 RX ORDER — CLOPIDOGREL 300 MG/1
600 TABLET, FILM COATED ORAL ONCE
Status: DISCONTINUED | OUTPATIENT
Start: 2023-11-10 | End: 2023-11-10

## 2023-11-10 RX ORDER — VITAMIN B COMPLEX
4000 TABLET ORAL DAILY
Status: DISCONTINUED | OUTPATIENT
Start: 2023-11-11 | End: 2023-11-11 | Stop reason: HOSPADM

## 2023-11-10 RX ORDER — PRAVASTATIN SODIUM 10 MG
10 TABLET ORAL DAILY
COMMUNITY
Start: 2023-10-09 | End: 2023-12-14

## 2023-11-10 RX ADMIN — CLOPIDOGREL BISULFATE 600 MG: 300 TABLET, FILM COATED ORAL at 17:22

## 2023-11-10 RX ADMIN — FENTANYL CITRATE 100 MCG: 50 INJECTION, SOLUTION INTRAMUSCULAR; INTRAVENOUS at 17:01

## 2023-11-10 RX ADMIN — DOCUSATE SODIUM 50 MG AND SENNOSIDES 8.6 MG 2 TABLET: 8.6; 5 TABLET, FILM COATED ORAL at 20:06

## 2023-11-10 RX ADMIN — HEPARIN SODIUM 12 UNITS/KG/HR: 5000 INJECTION, SOLUTION INTRAVENOUS at 14:45

## 2023-11-10 RX ADMIN — ATORVASTATIN CALCIUM 80 MG: 80 TABLET, FILM COATED ORAL at 20:06

## 2023-11-10 RX ADMIN — MIDAZOLAM HYDROCHLORIDE 2 MG: 1 INJECTION, SOLUTION INTRAMUSCULAR; INTRAVENOUS at 17:01

## 2023-11-10 RX ADMIN — CARVEDILOL 6.25 MG: 6.25 TABLET, FILM COATED ORAL at 20:06

## 2023-11-10 RX ADMIN — HEPARIN SODIUM: 1000 INJECTION, SOLUTION INTRAVENOUS; SUBCUTANEOUS at 16:34

## 2023-11-10 RX ADMIN — IOHEXOL 80 ML: 350 INJECTION, SOLUTION INTRAVENOUS at 17:19

## 2023-11-10 RX ADMIN — LIDOCAINE HYDROCHLORIDE: 20 INJECTION, SOLUTION INFILTRATION; PERINEURAL at 16:34

## 2023-11-10 RX ADMIN — NITROGLYCERIN 10 ML: 20 INJECTION INTRAVENOUS at 16:34

## 2023-11-10 RX ADMIN — VERAPAMIL HYDROCHLORIDE 2.5 MG: 2.5 INJECTION, SOLUTION INTRAVENOUS at 16:34

## 2023-11-10 RX ADMIN — HEPARIN SODIUM 2000 UNITS: 200 INJECTION, SOLUTION INTRAVENOUS at 16:35

## 2023-11-10 ASSESSMENT — COGNITIVE AND FUNCTIONAL STATUS - GENERAL
SUGGESTED CMS G CODE MODIFIER MOBILITY: CH
SUGGESTED CMS G CODE MODIFIER DAILY ACTIVITY: CH
DAILY ACTIVITIY SCORE: 24
MOBILITY SCORE: 24

## 2023-11-10 ASSESSMENT — PATIENT HEALTH QUESTIONNAIRE - PHQ9
2. FEELING DOWN, DEPRESSED, IRRITABLE, OR HOPELESS: NOT AT ALL
1. LITTLE INTEREST OR PLEASURE IN DOING THINGS: NOT AT ALL
SUM OF ALL RESPONSES TO PHQ9 QUESTIONS 1 AND 2: 0

## 2023-11-10 ASSESSMENT — FIBROSIS 4 INDEX: FIB4 SCORE: 1.36

## 2023-11-10 NOTE — ED TRIAGE NOTES
"Chief Complaint   Patient presents with    Chest Pressure     Starting this morning - Tx from [place] [facility] for NSTEMI     Pt BIb EMS from [place] for concerns of NSTEMI. Pt complaining of left arm pain/numbness for the last couple of days. Pt began to experience chest pressure this morning. Troponin at [facility] was >5000.    BP (!) 170/99   Pulse 72   Temp 36.7 °C (98.1 °F) (Temporal)   Resp 16   Ht 1.753 m (5' 9\")   Wt 88 kg (194 lb)   SpO2 95%         "

## 2023-11-10 NOTE — ED PROVIDER NOTES
ED Provider Note    CHIEF COMPLAINT  Chief Complaint   Patient presents with    Chest Pressure     Starting this morning - Tx from [place] for NSTEMI       EXTERNAL RECORDS REVIEWED  ER note from [facility] from earlier today    HPI/AISHA Gibbs Maura Duffy is a 73 y.o. male who presents via transfer for evaluation of NSTEMI.  History of end-stage renal disease on dialysis, over the past 5 days has been increasing left arm pain into the left chest.  Seems worse with movement.  At the outside facility underwent regular cardiac work-up revealing a troponin of 5500.  New EKG changes as well.  He was treated with heparin and aspirin, the patient arrives here completely pain-free.  He denies chest pain back pain and abdominal pain.  At this time he offers no specific complaints.  History of cerebrovascular disease, anemia, diabetes, hypertension and renal failure    PAST MEDICAL HISTORY   has a past medical history of Anemia, Cataract, CVA (cerebral vascular accident) (HCC), Dental disorder, Diabetes (HCC), Dialysis patient (HCC), Fx, Hashimoto's disease, Hashimoto's thyroiditis, High cholesterol, Hypertension, Macular degeneration, and Renal disorder.    SURGICAL HISTORY   has a past surgical history that includes other; cataract extraction with iol; facial fracture orif; and av fistula creation (Left, 5/10/2023).    FAMILY HISTORY  Family History   Problem Relation Age of Onset    Alcohol abuse Mother     Thyroid Mother     Alcohol abuse Father        SOCIAL HISTORY  Social History     Tobacco Use    Smoking status: Never    Smokeless tobacco: Never   Vaping Use    Vaping Use: Never used   Substance and Sexual Activity    Alcohol use: No    Drug use: No    Sexual activity: Not on file       CURRENT MEDICATIONS  Home Medications       Reviewed by Jacqui Luna R.N. (Registered Nurse) on 11/10/23 at 1422  Med List Status: Not Addressed     Medication Last Dose Status   ASPIRIN LOW DOSE 81 MG  "EC tablet  Active   calcium acetate (PHOS-LO) 667 MG Cap  Active   Cholecalciferol (VITAMIN D3) 2000 UNIT Cap  Active   levothyroxine (SYNTHROID) 75 MCG Tab  Active   linagliptin (TRADJENTA) 5 MG Tab tablet  Active   Multiple Vitamins-Minerals (PRESERVISION AREDS) Cap  Active   tamsulosin (FLOMAX) 0.4 MG capsule  Active                    ALLERGIES  No Known Allergies    PHYSICAL EXAM  VITAL SIGNS: BP (!) 170/99   Pulse 72   Temp 36.7 °C (98.1 °F) (Temporal)   Resp 16   Ht 1.753 m (5' 9\")   Wt 88 kg (194 lb)   SpO2 95%   BMI 28.65 kg/m²    Constitutional: Chronically ill-appearing  HENT: Normocephalic, no obvious evidence of acute trauma.  Eyes: No scleral icterus. Normal conjunctiva   Thorax & Lungs: Normal nonlabored respirations. I appreciate no wheezing, rhonchi or rales. There is normal air movement.  Upon cardiac ascultation I appreciate a regular heart rhythm and a normal rate.   Abdomen: The abdomen is not visibly distended. Upon palpation, I find it to be without tenderness.  No mass appreciated.  Skin: The exposed portions of skin reveal no obvious rash or other abnormalities.  Extremities/Musculoskeletal: No obvious sign of acute trauma. No asymmetric calf tenderness or edema.   Neurologic: Alert & oriented. No focal deficits observed.      DIAGNOSTIC STUDIES / PROCEDURES    LABS  Results for orders placed or performed during the hospital encounter of 11/10/23   CBC WITH DIFFERENTIAL   Result Value Ref Range    WBC 11.4 (H) 4.8 - 10.8 K/uL    RBC 3.33 (L) 4.70 - 6.10 M/uL    Hemoglobin 11.1 (L) 14.0 - 18.0 g/dL    Hematocrit 34.8 (L) 42.0 - 52.0 %    .5 (H) 81.4 - 97.8 fL    MCH 33.3 (H) 27.0 - 33.0 pg    MCHC 31.9 (L) 32.3 - 36.5 g/dL    RDW 45.0 35.9 - 50.0 fL    Platelet Count 214 164 - 446 K/uL    MPV 9.6 9.0 - 12.9 fL    Neutrophils-Polys 55.70 44.00 - 72.00 %    Lymphocytes 29.00 22.00 - 41.00 %    Monocytes 9.50 0.00 - 13.40 %    Eosinophils 4.40 0.00 - 6.90 %    Basophils 0.80 0.00 - " 1.80 %    Immature Granulocytes 0.60 0.00 - 0.90 %    Nucleated RBC 0.00 0.00 - 0.20 /100 WBC    Neutrophils (Absolute) 6.37 1.82 - 7.42 K/uL    Lymphs (Absolute) 3.31 1.00 - 4.80 K/uL    Monos (Absolute) 1.08 (H) 0.00 - 0.85 K/uL    Eos (Absolute) 0.50 0.00 - 0.51 K/uL    Baso (Absolute) 0.09 0.00 - 0.12 K/uL    Immature Granulocytes (abs) 0.07 0.00 - 0.11 K/uL    NRBC (Absolute) 0.00 K/uL   COMP METABOLIC PANEL   Result Value Ref Range    Sodium 142 135 - 145 mmol/L    Potassium 5.3 3.6 - 5.5 mmol/L    Chloride 102 96 - 112 mmol/L    Co2 25 20 - 33 mmol/L    Anion Gap 15.0 7.0 - 16.0    Glucose 93 65 - 99 mg/dL    Bun 44 (H) 8 - 22 mg/dL    Creatinine 7.96 (HH) 0.50 - 1.40 mg/dL    Calcium 9.0 8.5 - 10.5 mg/dL    Correct Calcium 8.9 8.5 - 10.5 mg/dL    AST(SGOT) 43 12 - 45 U/L    ALT(SGPT) 12 2 - 50 U/L    Alkaline Phosphatase 98 30 - 99 U/L    Total Bilirubin 0.4 0.1 - 1.5 mg/dL    Albumin 4.1 3.2 - 4.9 g/dL    Total Protein 7.4 6.0 - 8.2 g/dL    Globulin 3.3 1.9 - 3.5 g/dL    A-G Ratio 1.2 g/dL   TROPONIN   Result Value Ref Range    Troponin T 710 (H) 6 - 19 ng/L   proBrain Natriuretic Peptide, NT   Result Value Ref Range    NT-proBNP 32098 (H) 0 - 125 pg/mL   ESTIMATED GFR   Result Value Ref Range    GFR (CKD-EPI) 6 (A) >60 mL/min/1.73 m 2   EKG   Result Value Ref Range    Report       [facility] Emergency Dept.    Test Date:  2023-11-10  Pt Name:    ANDRE EVANGELISTA               Department: ER  MRN:        [id number]                      Room:       RD 12  Gender:     Male                         Technician: [id number]  :        1950                   Requested By:LANEY PRYOR  Order #:    [id number]                    Reading MD: LANEY PRYOR MD    Measurements  Intervals                                Axis  Rate:       71                           P:          44  WV:         167                          QRS:        -42  QRSD:       99                           T:          141  QT:          426  QTc:        463    Interpretive Statements  I independently interpreted this EKG  Sinus rhythm with a rate of 71.  New T wave inversions in V2, V3, V4, V5 with  nonspecific ST segments V2 and V3.  There is a slight biphasic component to  the T wave inversions in V2 and V3.  This is concerning for ischemia.  Electronically Signed On 11- 14:3 8:14 PST by LANEY PRYOR MD     EKG in four (4) hours   Result Value Ref Range    Report       [facility]    Test Date:  2023-11-10  Pt Name:    ANDRE EVANGELISTA               Department: ER  MRN:        [id number]                      Room:       [facility]  Gender:     Male                         Technician: [facility]  :        1950                   Requested By:MARC ROSE  Order #:    [id number]                    Reading MD:    Measurements  Intervals                                Axis  Rate:       70                           P:          49  NE:         179                          QRS:        -46  QRSD:       93                           T:          138  QT:         438  QTc:        473    Interpretive Statements  Sinus rhythm  Left anterior fascicular block  Low voltage, extremity leads  Abnrm T, probable ischemia, anterolateral lds  Compared to ECG 11/10/2023 14:24:13  Left anterior fascicular block now present  Low QRS voltage now present  Possible ischemia still present     POCT activated clotting time device results   Result Value Ref Range    Istat Activated Clotting Time 217 (H) 74 - 137 sec   POCT activated clotting time device results   Result Value Ref Range    Istat Activated Clotting Time 244 (H) 74 - 137 sec        COURSE & MEDICAL DECISION MAKING    ED Observation Status? No; Patient does not meet criteria for ED Observation.     INITIAL ASSESSMENT, COURSE AND PLAN  Care Narrative: This is a chronically ill 73-year-old male experiencing an NSTEMI with ischemic EKG changes.  As read here on heparin.  Upon his arrival he is now  completely pain-free.  He is chronically ill-appearing on exam without any other focal findings on exam.  His EKG has new large T wave inversions anterolaterally.  We will continue the heparin infusion Will emergently consult cardiology.  The patient is at a high chance of acute decompensation requiring the highest level preparedness for emergent interventions    Troponin is 700.  BNP 65,000.  At this point, the patient is admitted to the hospital in guarded condition, I discussed the case with the on-call cardiologist Dr. Walker and the plan is for urgent coronary catheterization.  In the meantime he will be maintained on heparin and admitted to the hospital in guarded condition    CRITICAL CARE  The very real possibilty of a deterioration of this patient's condition required the highest level of my preparedness for sudden, emergent intervention.  I provided critical care services, which included medication orders, frequent reevaluations of the patient's condition and response to treatment, ordering and reviewing test results, and discussing the case with various consultants.  The critical care time associated with the care of the patient was 39 minutes. Review chart for interventions. This time is exclusive of any other billable procedures.     DISPOSITION AND DISCUSSIONS  I have discussed management of the patient with the following physicians and PETER's: Dr. Walker, cardiology.  Dr. TAVERA, hospitalist    FINAL DIAGNOSIS  1. NSTEMI (non-ST elevated myocardial infarction) (HCC)           Electronically signed by: Amol Rollins M.D., 11/10/2023 2:35 PM

## 2023-11-10 NOTE — ED NOTES
Med rec is complete per Patient and Granddaughter with list at bedside.  Family present at time of interview with permission from Patient.  Pt has Dialysis at [facility] in [place] on Mondays, Wednesdays, and Fridays. Pt did not get Dialysis today. Pt last had Dialysis on Wednesday.  Per family, Pt was given 4 baby aspirin at other facility this morning. Pt did not take his Aspirin at home today.  Per pt, Doctor stopped his Prednisolone 1% eye drops yesterday. Per Pt , he will no longer be on any eye drops.  Allergies reviewed.    Has patient had any outside antibiotics in the last 30 days? N    Any Anticoagulants (rivaroxaban, apixaban, edoxaban, dabigatran, warfarin, enoxaparin) taken in the last 14 days? N         Pharmacy/Pharmacies Pt utilizes : [facility] [phone] and [facility] in [place] [phone] Mondays, Wednesdays, and Fridays

## 2023-11-11 ENCOUNTER — APPOINTMENT (OUTPATIENT)
Dept: CARDIOLOGY | Facility: MEDICAL CENTER | Age: 73
DRG: 321 | End: 2023-11-11
Attending: INTERNAL MEDICINE
Payer: MEDICARE

## 2023-11-11 VITALS
SYSTOLIC BLOOD PRESSURE: 126 MMHG | OXYGEN SATURATION: 95 % | HEART RATE: 71 BPM | DIASTOLIC BLOOD PRESSURE: 75 MMHG | RESPIRATION RATE: 16 BRPM | HEIGHT: 69 IN | BODY MASS INDEX: 27.79 KG/M2 | TEMPERATURE: 97.8 F | WEIGHT: 187.61 LBS

## 2023-11-11 PROBLEM — N18.6 END STAGE RENAL DISEASE ON DIALYSIS (HCC): Status: ACTIVE | Noted: 2023-11-11

## 2023-11-11 PROBLEM — Z99.2 END STAGE RENAL DISEASE ON DIALYSIS (HCC): Status: ACTIVE | Noted: 2023-11-11

## 2023-11-11 LAB
ALBUMIN SERPL BCP-MCNC: 3.4 G/DL (ref 3.2–4.9)
ALBUMIN/GLOB SERPL: 1.2 G/DL
ALP SERPL-CCNC: 68 U/L (ref 30–99)
ALT SERPL-CCNC: 16 U/L (ref 2–50)
ANION GAP SERPL CALC-SCNC: 14 MMOL/L (ref 7–16)
AST SERPL-CCNC: 81 U/L (ref 12–45)
BASOPHILS # BLD AUTO: 0.7 % (ref 0–1.8)
BASOPHILS # BLD: 0.06 K/UL (ref 0–0.12)
BILIRUB SERPL-MCNC: 0.3 MG/DL (ref 0.1–1.5)
BUN SERPL-MCNC: 50 MG/DL (ref 8–22)
CALCIUM ALBUM COR SERPL-MCNC: 8.9 MG/DL (ref 8.5–10.5)
CALCIUM SERPL-MCNC: 8.4 MG/DL (ref 8.5–10.5)
CHLORIDE SERPL-SCNC: 99 MMOL/L (ref 96–112)
CO2 SERPL-SCNC: 24 MMOL/L (ref 20–33)
CREAT SERPL-MCNC: 8.12 MG/DL (ref 0.5–1.4)
EOSINOPHIL # BLD AUTO: 0.28 K/UL (ref 0–0.51)
EOSINOPHIL NFR BLD: 3.4 % (ref 0–6.9)
ERYTHROCYTE [DISTWIDTH] IN BLOOD BY AUTOMATED COUNT: 46.4 FL (ref 35.9–50)
GFR SERPLBLD CREATININE-BSD FMLA CKD-EPI: 6 ML/MIN/1.73 M 2
GLOBULIN SER CALC-MCNC: 2.9 G/DL (ref 1.9–3.5)
GLUCOSE SERPL-MCNC: 123 MG/DL (ref 65–99)
HAV IGM SERPL QL IA: NORMAL
HBV CORE IGM SER QL: NORMAL
HBV SURFACE AB SERPL IA-ACNC: <3.5 MIU/ML (ref 0–10)
HBV SURFACE AG SER QL: NORMAL
HCT VFR BLD AUTO: 31.3 % (ref 42–52)
HCV AB SER QL: NORMAL
HGB BLD-MCNC: 10 G/DL (ref 14–18)
IMM GRANULOCYTES # BLD AUTO: 0.05 K/UL (ref 0–0.11)
IMM GRANULOCYTES NFR BLD AUTO: 0.6 % (ref 0–0.9)
LV EJECT FRACT  99904: 50
LV EJECT FRACT MOD 2C 99903: 45.92
LV EJECT FRACT MOD 4C 99902: 54.5
LV EJECT FRACT MOD BP 99901: 51.13
LYMPHOCYTES # BLD AUTO: 1.91 K/UL (ref 1–4.8)
LYMPHOCYTES NFR BLD: 23.5 % (ref 22–41)
MCH RBC QN AUTO: 33.7 PG (ref 27–33)
MCHC RBC AUTO-ENTMCNC: 31.9 G/DL (ref 32.3–36.5)
MCV RBC AUTO: 105.4 FL (ref 81.4–97.8)
MONOCYTES # BLD AUTO: 1.01 K/UL (ref 0–0.85)
MONOCYTES NFR BLD AUTO: 12.4 % (ref 0–13.4)
NEUTROPHILS # BLD AUTO: 4.81 K/UL (ref 1.82–7.42)
NEUTROPHILS NFR BLD: 59.4 % (ref 44–72)
NRBC # BLD AUTO: 0 K/UL
NRBC BLD-RTO: 0 /100 WBC (ref 0–0.2)
PLATELET # BLD AUTO: 184 K/UL (ref 164–446)
PMV BLD AUTO: 9.7 FL (ref 9–12.9)
POTASSIUM SERPL-SCNC: 5.5 MMOL/L (ref 3.6–5.5)
PROT SERPL-MCNC: 6.3 G/DL (ref 6–8.2)
RBC # BLD AUTO: 2.97 M/UL (ref 4.7–6.1)
SODIUM SERPL-SCNC: 137 MMOL/L (ref 135–145)
WBC # BLD AUTO: 8.1 K/UL (ref 4.8–10.8)

## 2023-11-11 PROCEDURE — 700102 HCHG RX REV CODE 250 W/ 637 OVERRIDE(OP): Performed by: INTERNAL MEDICINE

## 2023-11-11 PROCEDURE — 93306 TTE W/DOPPLER COMPLETE: CPT | Mod: 26 | Performed by: INTERNAL MEDICINE

## 2023-11-11 PROCEDURE — 80053 COMPREHEN METABOLIC PANEL: CPT

## 2023-11-11 PROCEDURE — A9270 NON-COVERED ITEM OR SERVICE: HCPCS | Performed by: INTERNAL MEDICINE

## 2023-11-11 PROCEDURE — 700102 HCHG RX REV CODE 250 W/ 637 OVERRIDE(OP): Performed by: NURSE PRACTITIONER

## 2023-11-11 PROCEDURE — 86706 HEP B SURFACE ANTIBODY: CPT

## 2023-11-11 PROCEDURE — 93306 TTE W/DOPPLER COMPLETE: CPT

## 2023-11-11 PROCEDURE — A9270 NON-COVERED ITEM OR SERVICE: HCPCS | Performed by: NURSE PRACTITIONER

## 2023-11-11 PROCEDURE — 99232 SBSQ HOSP IP/OBS MODERATE 35: CPT | Performed by: NURSE PRACTITIONER

## 2023-11-11 PROCEDURE — 99239 HOSP IP/OBS DSCHRG MGMT >30: CPT | Performed by: STUDENT IN AN ORGANIZED HEALTH CARE EDUCATION/TRAINING PROGRAM

## 2023-11-11 PROCEDURE — 700111 HCHG RX REV CODE 636 W/ 250 OVERRIDE (IP)

## 2023-11-11 PROCEDURE — 90935 HEMODIALYSIS ONE EVALUATION: CPT

## 2023-11-11 PROCEDURE — 5A1D70Z PERFORMANCE OF URINARY FILTRATION, INTERMITTENT, LESS THAN 6 HOURS PER DAY: ICD-10-PCS | Performed by: INTERNAL MEDICINE

## 2023-11-11 PROCEDURE — 80074 ACUTE HEPATITIS PANEL: CPT

## 2023-11-11 PROCEDURE — 700117 HCHG RX CONTRAST REV CODE 255: Performed by: INTERNAL MEDICINE

## 2023-11-11 PROCEDURE — 36415 COLL VENOUS BLD VENIPUNCTURE: CPT

## 2023-11-11 PROCEDURE — 85025 COMPLETE CBC W/AUTO DIFF WBC: CPT

## 2023-11-11 RX ORDER — CARVEDILOL 6.25 MG/1
6.25 TABLET ORAL 2 TIMES DAILY WITH MEALS
Qty: 60 TABLET | Refills: 0 | Status: SHIPPED | OUTPATIENT
Start: 2023-11-11 | End: 2023-12-11

## 2023-11-11 RX ORDER — HEPARIN SODIUM 1000 [USP'U]/ML
3200 INJECTION, SOLUTION INTRAVENOUS; SUBCUTANEOUS
Status: DISCONTINUED | OUTPATIENT
Start: 2023-11-11 | End: 2023-11-11 | Stop reason: HOSPADM

## 2023-11-11 RX ORDER — ATORVASTATIN CALCIUM 80 MG/1
80 TABLET, FILM COATED ORAL EVERY EVENING
Qty: 30 TABLET | Refills: 0 | Status: SHIPPED | OUTPATIENT
Start: 2023-11-11 | End: 2023-12-11

## 2023-11-11 RX ORDER — CLOPIDOGREL BISULFATE 75 MG/1
75 TABLET ORAL DAILY
Qty: 30 TABLET | Refills: 0 | Status: SHIPPED | OUTPATIENT
Start: 2023-11-12 | End: 2023-12-12

## 2023-11-11 RX ORDER — HEPARIN SODIUM 1000 [USP'U]/ML
INJECTION, SOLUTION INTRAVENOUS; SUBCUTANEOUS
Status: COMPLETED
Start: 2023-11-11 | End: 2023-11-11

## 2023-11-11 RX ADMIN — CARVEDILOL 6.25 MG: 6.25 TABLET, FILM COATED ORAL at 09:44

## 2023-11-11 RX ADMIN — TAMSULOSIN HYDROCHLORIDE 0.4 MG: 0.4 CAPSULE ORAL at 09:44

## 2023-11-11 RX ADMIN — CLOPIDOGREL BISULFATE 75 MG: 75 TABLET ORAL at 05:05

## 2023-11-11 RX ADMIN — HEPARIN SODIUM 3200 UNITS: 1000 INJECTION, SOLUTION INTRAVENOUS; SUBCUTANEOUS at 12:32

## 2023-11-11 RX ADMIN — DOCUSATE SODIUM 50 MG AND SENNOSIDES 8.6 MG 2 TABLET: 8.6; 5 TABLET, FILM COATED ORAL at 05:06

## 2023-11-11 RX ADMIN — Medication 1334 MG: at 05:05

## 2023-11-11 RX ADMIN — LEVOTHYROXINE SODIUM 75 MCG: 0.07 TABLET ORAL at 05:06

## 2023-11-11 RX ADMIN — Medication 4000 UNITS: at 05:04

## 2023-11-11 RX ADMIN — ASPIRIN 81 MG: 81 TABLET, COATED ORAL at 05:06

## 2023-11-11 RX ADMIN — APIXABAN 5 MG: 5 TABLET, FILM COATED ORAL at 13:13

## 2023-11-11 RX ADMIN — HUMAN ALBUMIN MICROSPHERES AND PERFLUTREN 3 ML: 10; .22 INJECTION, SOLUTION INTRAVENOUS at 12:02

## 2023-11-11 ASSESSMENT — ENCOUNTER SYMPTOMS
ORTHOPNEA: 0
CHEST TIGHTNESS: 0
CONFUSION: 0
POLYDIPSIA: 0
WEIGHT LOSS: 0
NERVOUS/ANXIOUS: 0
NAUSEA: 0
HEARTBURN: 0
FEVER: 0
COLOR CHANGE: 0
NUMBNESS: 0
PALPITATIONS: 0
DOUBLE VISION: 0
BLURRED VISION: 0
BLOOD IN STOOL: 0
BACK PAIN: 0
AGITATION: 0
SHORTNESS OF BREATH: 0
HEMOPTYSIS: 0
COUGH: 0
SPUTUM PRODUCTION: 0
BRUISES/BLEEDS EASILY: 0
HEADACHES: 0
FLANK PAIN: 0
DIAPHORESIS: 0
DIZZINESS: 0
PHOTOPHOBIA: 0
SPEECH CHANGE: 0
ABDOMINAL DISTENTION: 0
CHILLS: 0
NECK PAIN: 0
VOMITING: 0
TREMORS: 0
FOCAL WEAKNESS: 0
TROUBLE SWALLOWING: 0
HALLUCINATIONS: 0
ABDOMINAL PAIN: 0

## 2023-11-11 ASSESSMENT — PAIN DESCRIPTION - PAIN TYPE: TYPE: ACUTE PAIN

## 2023-11-11 ASSESSMENT — CHA2DS2 SCORE
CHF OR LEFT VENTRICULAR DYSFUNCTION: NO
PRIOR STROKE OR TIA OR THROMBOEMBOLISM: NO
VASCULAR DISEASE: YES
CHA2DS2 VASC SCORE: 5
HYPERTENSION: YES
AGE 65 TO 74: NO
AGE 75 OR GREATER: YES
SEX: MALE
DIABETES: YES

## 2023-11-11 ASSESSMENT — LIFESTYLE VARIABLES: SUBSTANCE_ABUSE: 0

## 2023-11-11 ASSESSMENT — FIBROSIS 4 INDEX: FIB4 SCORE: 8.25

## 2023-11-11 NOTE — DISCHARGE INSTRUCTIONS
- Follow up with primary care physician in 1 week.   - Follow up with your cardiology as instructed  - Please take the medications as instructed    - Go to the local Emergency Department if you have any worsening condition.   Diagnosis:  Acute Coronary Syndrome (ACS) is a diagnosis that encompasses cardiac-related chest pain and heart attack. ACS occurs when the blood flow to the heart muscle is severely reduced or cut off completely due to a slow process called atherosclerosis.  Atherosclerosis is a disease in which the coronary arteries become narrow from a buildup of fat, cholesterol, and other substances that combine to form plaque. If the plaque breaks, a blood clot will form and block the blood flow to the heart muscle. This lack of blood flow can cause damage or death to the heart muscle which is called a heart attack or Myocardial Infarction (MI). There are two different types of MIs:  ST Elevation Myocardial Infarction or STEMI (the most severe type of heart attack) and Non-ST Elevation Myocardial Infarction or NSTEMI.    Treatment Plan:  Cardiac Diet  - Low fat, low salt, low cholesterol   Cardiac Rehab  - Your doctor has ordered you a referral to [facility].  Call [phone] to schedule an appointment.  Attend my follow-up appointment with my Cardiologist.  Take my medications as prescribed by my doctor  Exercise daily  Lower my bad cholesterol and raise my good cholesterol, lower my blood pressure, Reduce stress, and Control my diabetes    Medications:  Certain medications are used to treat ACS.  Remember to always take medications as prescribed and never stop talking medications unless told by your doctor.    You have been prescribed the following medicatons:    Anti-platelet/blood thinner - Your Anti-platelet/Blood thinning medication is called Plavix, and is used in combination with aspirin to prevent clots from forming in your heart and/or around your stent.  Your doctor will determine how long you  "need to be on this medicine.  Beta-Blocker - Beta-Blocker Coreg is used to lower blood pressure and heart rate, and/or helps your heart heal after a heart attack.  Statin - Statin Atorvastatin  is used to lower cholesterol.    Post Angiogram Groin Care Instructions     INSTRUCTIONS  Examine (look and feel) the site of your incision site TODAY so you can recognize changes that should be called to your doctor (see below).  Avoid straining either by lifting or pulling objects for 4-5 days. Avoid lifting over 5 pounds.   For at least 72 hours, if you should sneeze or cough, please hold pressure over your groin area.  If you should begin to have oozing from the catheterization site, please hold firm pressure and call your doctor's office immediately.  If profuse bleeding occurs from the catheterization site, hold firm pressure and call \"[phone]\" immediately for assistance.  Remove bandage after 24 hours.     ACTIVITY  Limit activity as instructed by your doctor.  No driving or very limited driving with frequent stops for one week.   If you must take a long car ride, stop every hour and walk around the car.   Warm showers or baths are permitted after the bandage is removed. Avoid hot showers, baths, hot tubs, and swimming for one week.    PLEASE CALL YOUR DOCTOR IF:  Temperature elevation occurs.  Catheterization site becomes reddened or begins to drain.   Bruising appears to be new or not resolving. The bruise may move down your leg. This is normal.  The small round lump in the groin increases in size.  Any leg numbness, aching, or discomfort (immediately).  Increasing discomfort in the leg at the insertion site.  Chest pains, even if relieved by Nitroglycerin.    MISCELLANEOUS INSTRUCTIONS  Bruising may occur as a result of heart catheterization. Some of the discoloration may travel down the leg, going from blue to green in color.  A small round lump under the catheterization site will remain for up to six weeks.  If any " questions arise call your physician's office. The Contact Center is open Monday through Friday 7AM to 5PM and may speak to a nurse at [phone], or toll free at [phone].   You should call [phone] if you develop problems with breathing or chest pain.    FOR PROBLEMS CALL [facility] [place] at [phone]    I acknowledge receipt and understanding of these Home Care instructions.

## 2023-11-11 NOTE — DIETARY
Nutrition Services: Cardiac Education Consult   Day 1 of admit.  Sai Lorenzo Jr. is a 73 y.o. male with admitting DX of NSTEMI (non-ST elevated myocardial infarction)    RD visited pt's room to provide heart healthy diet education. Pt was out of room in dialysis. RD provided handout with heart healthy dietary modifications at bedside. RD to follow up with explanation as able.      No other education needs identified at this time. Consider referral to outpatient nutrition services for continuation of education as indicated or per pt preferences.     Please re-consult RD as indicated.

## 2023-11-11 NOTE — PROGRESS NOTES
Assumed care at 0700. Bedside report received from Marcos. Patient's chart and MAR reviewed. Pt denies pain at this time. Pt is A & O 4. Patient was updated on plan of care for the day. Questions answered and concerns addressed.  Pt denies any additional needs at this time. White board updated. Call light, phone and personal belongings within reach.

## 2023-11-11 NOTE — DISCHARGE SUMMARY
Discharge Summary    CHIEF COMPLAINT ON ADMISSION  Chief Complaint   Patient presents with    Chest Pressure     Starting this morning - Tx from [place] for NSTEMI       Reason for Admission  ems     Admission Date  11/10/2023    CODE STATUS  Prior    HPI & HOSPITAL COURSE  Sai Lorenzo Jr. is a 73 y.o. male with history of CVA (2009 and 2015), PH, HTN, T2DM (A1C 6.8% 3/2022, down to 5.8% 11/2022), ESRD on HD (failed left arm AV fistula), pAF not on AC, presented with complaints of left arm pain radiating to the left chest.  He was seen at outside hospital where he noted to have troponin 5500 and T wave inversion in pectoral leads.  He was started on heparin and transferred here.  He undergone coronary angiography with stent placement in the mid left anterior descending coronary artery.  ECHO showed EF 50%. His chest pain resolved. Patient is cleared for discharge by by cardiology. Will continue plavix and eliquis. Continue coreg and statin. no aldactone or SGLT2 inhibitors in setting ESRD. Patient is referred to cardiac rehab.    For ESRD on HD MWF - Patient missed the dialysis yesterday. I Discussed with nephrology. Patient had dialysis before discharge.       Therefore, he is discharged in good and stable condition to home with close outpatient follow-up.    The patient recovered much more quickly than anticipated on admission.    Discharge Date  11/11/2023    FOLLOW UP ITEMS POST DISCHARGE  - Follow up with primary care physician in 1 week.   - Follow up with your cardiology as instructed  - Please take the medications as instructed    - Go to the local Emergency Department if you have any worsening condition.     DISCHARGE DIAGNOSES  Principal Problem:    NSTEMI (non-ST elevated myocardial infarction) (HCC) (POA: Yes)  Active Problems:    Type 2 diabetes mellitus with stage 3b chronic kidney disease, without long-term current use of insulin (HCC) (POA: Yes)    Essential hypertension (POA: Yes)    Benign  localized prostatic hyperplasia with lower urinary tract symptoms (LUTS) (POA: Yes)    Anemia of chronic kidney failure (POA: Yes)    End stage renal disease on dialysis (HCC) (POA: Unknown)  Resolved Problems:    * No resolved hospital problems. *      FOLLOW UP  Future Appointments   Date Time Provider Department Center   12/14/2023 11:15 AM [facility] [place] None     Bernie Asher M.D.  [address] [facility] [address]  [phone]    Follow up in 1 week(s)  recent hospitalization follow up, Please call your primary care provider to schedule      MEDICATIONS ON DISCHARGE     Medication List        START taking these medications        Instructions   apixaban 5mg Tabs  Commonly known as: Eliquis   Take 1 Tablet by mouth 2 times a day for 30 days. Indications: Thromboembolism secondary to Atrial Fibrillation  Dose: 5 mg     atorvastatin 80 MG tablet  Commonly known as: Lipitor   Take 1 Tablet by mouth every evening for 30 days.  Dose: 80 mg     carvedilol 6.25 MG Tabs  Commonly known as: Coreg   Take 1 Tablet by mouth 2 times a day with meals for 30 days.  Dose: 6.25 mg     clopidogrel 75 MG Tabs  Start taking on: November 12, 2023  Commonly known as: Plavix   Take 1 Tablet by mouth every day for 30 days.  Dose: 75 mg            CONTINUE taking these medications        Instructions   calcium acetate 667 MG Caps  Commonly known as: Phos-Lo   Take 1,334 mg by mouth every day.  Dose: 1,334 mg     levothyroxine 75 MCG Tabs  Commonly known as: Synthroid   Take 75 mcg by mouth every morning on an empty stomach.  Dose: 75 mcg     linagliptin 5 MG Tabs tablet  Commonly known as: Tradjenta   Take 5 mg by mouth every day.  Dose: 5 mg     pravastatin 10 MG Tabs  Commonly known as: Pravachol   Take 10 mg by mouth every day.  Dose: 10 mg     PreserVision AREDS Caps   Take 1 Capsule by mouth 2 times a day.  Dose: 1 Capsule     tamsulosin 0.4 MG capsule  Commonly known as: Flomax   Take 1 Capsule by  mouth 1/2 hour after breakfast.  Dose: 0.4 mg     Vitamin D3 2000 UNIT Caps   Take 4,000 Units by mouth every day.  Dose: 4,000 Units            STOP taking these medications      aspirin 81 MG Chew chewable tablet  Commonly known as: Asa              Allergies  No Known Allergies    DIET  No orders of the defined types were placed in this encounter.      ACTIVITY  As tolerated.  Weight bearing as tolerated    CONSULTATIONS  cardio    PROCEDURES  [facility]    LABORATORY  Lab Results   Component Value Date    SODIUM 137 11/11/2023    POTASSIUM 5.5 11/11/2023    CHLORIDE 99 11/11/2023    CO2 24 11/11/2023    GLUCOSE 123 (H) 11/11/2023    BUN 50 (H) 11/11/2023    CREATININE 8.12 (HH) 11/11/2023    GLOMRATE 5 (L) 11/30/2022        Lab Results   Component Value Date    WBC 8.1 11/11/2023    HEMOGLOBIN 10.0 (L) 11/11/2023    HEMATOCRIT 31.3 (L) 11/11/2023    PLATELETCT 184 11/11/2023        Total time of the discharge process exceeds 34 minutes.

## 2023-11-11 NOTE — PROGRESS NOTES
Cardiology Follow Up Progress Note    Date of Service  11/11/2023    Attending Physician  Daja Lewis M.D.    Chief Complaint   Chest pain and left arm pain     ОЛЬГА Lorenzo Jr. is a 73 y.o. male admitted 11/10/2023 with medical history of CVA (2009 and 2015), PH, hypertension , T2DM, ESRD on HD (failed left arm AV fistula), pAF not on AC (ran out with no refills few months ago.    Presented to [facility] [place], [place] with chest pain, transferred for NSTEMI.     Interim Events  No events overnight   Denies any chest pain, sob, dizziness or palpitations  NSR on the monitor   Vs stable   Right radial is soft and good peripheral pulse     Review of Systems  Review of Systems   Constitutional:  Negative for chills, diaphoresis and fever.   HENT:  Negative for nosebleeds and trouble swallowing.    Respiratory:  Negative for cough, chest tightness and shortness of breath.    Cardiovascular:  Negative for chest pain, palpitations and leg swelling.   Gastrointestinal:  Negative for abdominal distention, abdominal pain and blood in stool.   Genitourinary:  Negative for hematuria.   Skin:  Negative for color change.   Neurological:  Negative for dizziness, syncope and numbness.   Psychiatric/Behavioral:  Negative for agitation and confusion. The patient is not nervous/anxious.        Vital signs in last 24 hours  Temp:  [36.2 °C (97.2 °F)-36.7 °C (98.1 °F)] 36.5 °C (97.7 °F)  Pulse:  [65-78] 73  Resp:  [16-18] 16  BP: (111-170)/() 138/83  SpO2:  [91 %-96 %] 93 %    Physical Exam  Physical Exam  Vitals and nursing note reviewed.   Constitutional:       Appearance: Normal appearance.   HENT:      Head: Normocephalic and atraumatic.   Eyes:      Pupils: Pupils are equal, round, and reactive to light.   Cardiovascular:      Rate and Rhythm: Normal rate and regular rhythm.      Heart sounds: Normal heart sounds. No murmur heard.  Pulmonary:      Effort: Pulmonary effort is normal.      Breath sounds: Normal breath sounds.   Abdominal:       General: Abdomen is flat.   Musculoskeletal:      Cervical back: Normal range of motion.   Skin:     General: Skin is warm and dry.   Neurological:      General: No focal deficit present.      Mental Status: He is alert and oriented to person, place, and time.   Psychiatric:         Mood and Affect: Mood normal.         Behavior: Behavior normal.         Thought Content: Thought content normal.         Judgment: Judgment normal.         Lab Review  Lab Results   Component Value Date/Time    WBC 8.1 11/11/2023 02:26 AM    RBC 2.97 (L) 11/11/2023 02:26 AM    HEMOGLOBIN 10.0 (L) 11/11/2023 02:26 AM    HEMATOCRIT 31.3 (L) 11/11/2023 02:26 AM    .4 (H) 11/11/2023 02:26 AM    MCH 33.7 (H) 11/11/2023 02:26 AM    MCHC 31.9 (L) 11/11/2023 02:26 AM    MPV 9.7 11/11/2023 02:26 AM      Lab Results   Component Value Date/Time    SODIUM 137 11/11/2023 02:26 AM    POTASSIUM 5.5 11/11/2023 02:26 AM    CHLORIDE 99 11/11/2023 02:26 AM    CO2 24 11/11/2023 02:26 AM    GLUCOSE 123 (H) 11/11/2023 02:26 AM    BUN 50 (H) 11/11/2023 02:26 AM    CREATININE 8.12 (HH) 11/11/2023 02:26 AM    GLOMRATE 5 (L) 11/30/2022 07:05 AM      Lab Results   Component Value Date/Time    ASTSGOT 81 (H) 11/11/2023 02:26 AM    ALTSGPT 16 11/11/2023 02:26 AM     Lab Results   Component Value Date/Time    TROPONINT 710 (H) 11/10/2023 03:25 PM       Recent Labs     11/10/23  1525   NTPROBNP [id number]*       Cardiac Imaging and Procedures Review  Echocardiogram:  pending     Cardiac Catheterization    11/10/2023   Coronary arteriograms:  Left main: 20-30% disease in proximal portion.  Left anterior descending: Large calcified vessel, occluded in mid portion.  Left circumflex: Gives first marginal branch in proximal portion, which has mild disease. Mid left circumflex artery is small, has 70% stenosis, supplies smaller marginal branch distally.   Right coronary: 50% stenosis in ostial portion, distal RCA is tortuous and has 50-60% stenosis dominant     Left  Heart Catheterization:  Left Ventriculogram: ejection fraction 35%  Left Ventricular EDP: 20 mm Hg   Aortic Valve Gradient: No significant AV gradient noted      Assessment/Plan  No new Assessment & Plan notes have been filed under this hospital service since the last note was generated.  Service: Cardiology      NSTEMI:  HFrEF 35% noted on LV gram   - PCI to mid LAD   - ECHO ordered and pending   - Continue plavix 75mg qd and apixaban 5mg BID   - continue coreg 6.25mg BID and atorvastatin 80mg qd    - cardiac rehab   - no aldactone or SGLT2 inhibitors in setting ESRD    2. Paroxysmal atrial fibrillation:  - bb therapy as noted above  - restart apixaban     3. CVA:  - OAC and statin therapy       Future Appointments   Date Time Provider Department Center   12/14/2023 11:15 AM THEO Helm [facility] None       I personally spent a total of 20 minutes which includes face-to-face time and non-face-to-face time spent on preparing to see the patient, reviewing hospital notes and tests, obtaining history from the patient, performing a medically appropriate exam, counseling and educating the patient, ordering medications/tests/procedures/referrals as clinically indicated, and documenting information in the electronic medical record.     Thank you for allowing me to participate in the care of this patient.  I will continue to follow this patient    Please contact me with any questions.    THEO Traylor

## 2023-11-11 NOTE — ASSESSMENT & PLAN NOTE
Biopsy Method: 15 blade Status post left heart catheterization, coronary angiography with stent placement in the mid left anterior descending coronary artery  Medical optimization per cardiology.  Continue Lipitor, Coreg,  Aspirin and Plavix to start tomorrow  Interim telemetry for arrhythmia  Obtain transthoracic echo   Billing Type: Third-Party Bill Detail Level: Detailed Notification Instructions: Patient will be notified of biopsy results. However, patient instructed to call the office if not contacted within 2 weeks. Anesthesia Volume In Cc (Will Not Render If 0): 0.5 Dressing: bandage Biopsy Type: H and E Electrodesiccation Text: The wound bed was treated with electrodesiccation after the biopsy was performed. Size Of Lesion In Cm: 0 Bill For Surgical Tray: no Curettage Text: The wound bed was treated with curettage after the biopsy was performed. Consent: Written consent was obtained and risks were reviewed including but not limited to scarring, infection, bleeding, scabbing, incomplete removal, nerve damage and allergy to anesthesia. Wound Care: Bacitracin Hemostasis: Aluminum Chloride and Electrocautery Anesthesia Type: 2% lidocaine without epinephrine Was A Bandage Applied: Yes Electrodesiccation And Curettage Text: The wound bed was treated with electrodesiccation and curettage after the biopsy was performed. Type Of Destruction Used: Curettage Post-Care Instructions: I reviewed with the patient in detail post-care instructions. Patient is to keep the biopsy site dry overnight, and then apply bacitracin twice daily until healed. Patient may apply hydrogen peroxide soaks to remove any crusting. Cryotherapy Text: The wound bed was treated with cryotherapy after the biopsy was performed. Silver Nitrate Text: The wound bed was treated with silver nitrate after the biopsy was performed.

## 2023-11-11 NOTE — PROCEDURES
Cardiac Catheterization and Percutaneous Intervention Procedure Report    11/10/2023    Referring MD:     Primary Care Provider: Bernie Asher M.D.    Indication for procedure: ACS <24 hours    Procedures:  right and left coronary arteriograms  Left heart catheterization and Left ventriculogram  Angioplasty and placement of a 4.0 by 16mm Synergy megatron drug-eluting stent in mid  left anterior descending coronary artery.      Coronary arteriograms:  Left main: 20-30% disease in proximal portion.  Left anterior descending: Large calcified vessel, occluded in mid portion.  Left circumflex: Gives first marginal branch in proximal portion, which has mild disease. Mid left circumflex artery is small, has 70% stenosis, supplies smaller marginal branch distally.   Right coronary: 50% stenosis in ostial portion, distal RCA is tortuous and has 50-60% stenosis dominant    Left Heart Catheterization:  Left Ventriculogram: ejection fraction 35%  Left Ventricular EDP: 20 mm Hg   Aortic Valve Gradient: No significant AV gradient noted    Procedure details:  Sai Lorenzo Jr. was brought to the cardiac catheterization lab where the right wrist was prepped and draped in the usual manner for cardiac catheterization.  Timeout was performed.  The area was anesthetized with lidocaine and a 5/6 FR sheath was inserted into the right radial artery without difficulty. Sheath did not advance into radial artery, then right femoral access was obtained with 6 fr sheath. A #4 left Krystian catheter was advanced to the ostia of the Left coronary artery and arteriograms were recorded.   A #4 right Krystian catheter was advanced to the ostia of the right coronary artery and arteriograms were recorded. Aortic valve was crossed using #4 right Krystian catheter left heart catheterization and left ventriculogram were performed.  Patient underwent percutaneous coronary revascularization as outlined below.  At the completion of the case the  "sheath was removed and hemostasis achieved utilizing a radial compression band .  Patient was pain-free and hemodynamically stable at the completion of the case.  There were no apparent complications.    Interventional Procedure:     Given the patient's clinical presentation and coronary anatomy, PCI was indicated and we proceeded with the intervention as detailed below.    Indication for PCI:  NSTE- ACD    Pre: 100 %, 12 mm length, MICHAEL 0 flow  Post: 0%, MICHAEL 3 flow    Lesion complexity  High  Severe calcification yes  Bifurcation  yes    Guide catheter: EBU 3.5 was advanced to the ostia of the left coronary artery.    Guide wire: A 0.014\" mm  Runthrough was advanced into the artery and crossed the lesion.    Balloon pre-dilatation: 3.0 by 20 mm NC Emerge inflated to 16 CONSUELO to pre-dilate the lesion.    Stent: A 4.0 by 16 mm Synergy megatron drug-eluting  stent was deployed in mid  left anterior descending coronary artery at 14 CONSUELO.    Post dilatation is achieved using  4.0 by 12 mm NC Emergeinflated to 16 CONSUELO.   Distal LAD has mild to moderate diffuse disease but overall non-obstructive.    Anticoagulant: Heparin  Antiplatelet: clopidogrel  EBL <25 cc  Complications: none  Specimens: none  Contrast: 80 cc  Fluorotime : see cath lab flowsheet      Sedation: I supervised moderate sedation over a trained independent observer.    Sedation start time: 16:41  End time: 17:19      Electronically signed by   Ace Pennington M.D., FOREIGN  Interventional cardiologist  11/10/2023  7:24 PM          "

## 2023-11-11 NOTE — CONSULTS
CARDIOLOGY CONSULTATION NOTE      Reason of Consult: NSTEMI    Consulting Physician: Amol Rollins MD    HPI:  73 M transferred from [facility] [place] for NSTEMI.    He has history of CVA (2009 and 2015), PH, HTN, T2DM (A1C 6.8% 3/2022, down to 5.8% 11/2022), ESRD on HD (failed left arm AV fistula), pAF not on AC (ran out with no refills few months ago), is here with CP with NSTEMI concerns.    He started noticing CP with left arm radiation on Tuesday and recurred this am hence sought care. EKG with abnormal anterior T waves and elevated troponin.    OP meds include: metoprolol, amlodipine, supposedly apixaban, lasix, pravastatin.    Denies LASHELL, orthopnea, PND, syncope.    Granddaughter at bedside. Spoke with his daughter and wife over the phone about plan of care.    Non smoker  No illicit drug use  Denies known fam history of ASCVD    Past Medical History:   Diagnosis Date    Anemia     Cataract     iol bilat    CVA (cerebral vascular accident) ([facility]ly [facility])     2009,2015- left sided weakness    Dental disorder     Full set dentures    Diabetes ([facility])     Dialysis patient (Formerly [facility])     Fx     face jaw hand    Hashimoto's disease     Hashimoto's thyroiditis     High cholesterol     Hypertension     Macular degeneration     bilateral    Renal disorder     stage 3b       Social History     Socioeconomic History    Marital status:      Spouse name: Not on file    Number of children: Not on file    Years of education: Not on file    Highest education level: Not on file   Occupational History    Not on file   Tobacco Use    Smoking status: Never    Smokeless tobacco: Never   Vaping Use    Vaping Use: Never used   Substance and Sexual Activity    Alcohol use: No    Drug use: No    Sexual activity: Not on file   Other Topics Concern    Not on file   Social History Narrative    Not on file     Social Determinants of Health     Financial Resource Strain: Not on file   Food Insecurity: Not on file   Transportation Needs: Not on file  "  Physical Activity: Not on file   Stress: Not on file   Social Connections: Not on file   Intimate Partner Violence: Not on file   Housing Stability: Not on file       No current facility-administered medications on file prior to encounter.     Current Outpatient Medications on File Prior to Encounter   Medication Sig Dispense Refill    pravastatin (PRAVACHOL) 10 MG Tab Take 10 mg by mouth every day.      aspirin (ASA) 81 MG Chew Tab chewable tablet Chew 81 mg every day. Pt had 4 baby aspirin from transferring hospital prior to admit      calcium acetate (PHOS-LO) 667 MG Cap Take 1,334 mg by mouth every day.      Cholecalciferol (VITAMIN D3) 2000 UNIT Cap Take 4,000 Units by mouth every day.      tamsulosin (FLOMAX) 0.4 MG capsule Take 1 Capsule by mouth 1/2 hour after breakfast. 30 Capsule 0    Multiple Vitamins-Minerals (PRESERVISION AREDS) Cap Take 1 Capsule by mouth 2 times a day.      levothyroxine (SYNTHROID) 75 MCG Tab Take 75 mcg by mouth every morning on an empty stomach.      linagliptin (TRADJENTA) 5 MG Tab tablet Take 5 mg by mouth every day.         Current Facility-Administered Medications   Medication Dose Frequency Provider Last Rate Last Admin    heparin infusion 25,000 units in 500 mL 0.45% NACL  0-30 Units/kg/hr Continuous Amol Rollins M.D. 21.2 mL/hr at 11/10/23 1445 12 Units/kg/hr at 11/10/23 1445    heparin injection 2,000 Units  2,000 Units PRN Amol Rollins M.D.       Last reviewed on 11/10/2023  3:38 PM by Heidy Cedillo     Patient has no known allergies.    Family History   Problem Relation Age of Onset    Alcohol abuse Mother     Thyroid Mother     Alcohol abuse Father        ROS: As per HPI all other systems reviewed and negative     Physical Exam   Blood pressure (!) 170/99, pulse 72, temperature 36.7 °C (98.1 °F), temperature source Temporal, resp. rate 16, height 1.753 m (5' 9\"), weight 88 kg (194 lb), SpO2 95 %.    Constitutional: in NAD  HENT: Normocephalic and " atraumatic.  Neck: No JVD present.   Cardiovascular: Normal rate. Exam reveals no gallop and no friction rub. No murmur heard.   Pulmonary/Chest: CTAB   Abdominal: S/NT/ND BS+   Musculoskeletal:  weak radial pulses.  Extremities: Exhibits no edema. No clubbing or cyanosis.   Non -functional left arm AV fistula  Has right sided chest tunneled catheter  Skin: Skin is warm and dry.   Neuro: Non-focal, CN 2-12 intact grossly    No intake or output data in the 24 hours ending 11/10/23 1610    Recent Labs     11/10/23  1131 11/10/23  1525   WBC 10.7 11.4*   RBC 3.04* 3.33*   HEMOGLOBIN 10.3* 11.1*   HEMATOCRIT 31.6* 34.8*   .9* 104.5*   MCH 33.9* 33.3*   MCHC 32.6* 31.9*   RDW 11.9 45.0   PLATELETCT 203 214   MPV 9.4 9.6     Recent Labs     11/10/23  1131   SODIUM 140   POTASSIUM 5.3*   CHLORIDE 101   CO2 30   GLUCOSE 183*   BUN 47*   CREATININE 8.4*   CALCIUM 8.4*     Recent Labs     11/10/23  1131   APTT 26.1         Recent Labs     11/10/23  1131   TROPONINI 5592.2*             Imaging reviewed    Impressions:  # NSTEMI  # HTN  # ESRD on HD  # pAF not on AC as OP given he ran out and did not get refills  # History of CVA 2009 and 2015  # T2DM (A1C 6.8% 3/2022, 5.8% 11/2022)    Recommendations:  - ASA 81mg, HI statin  - TTE  - BP control, target < 140/80mmHg  - added on lipid panel and A1C to labs  - nephrology consult for HD, mild hyperK  - will finalize cardiac meds after TTE and LHC/CA results  - NPO for cath tentatively today  - resume Apixaban post cath resultsnfor pAF and history of CVA  - recommend carotid US this admission given history multiple CVAs    Discussed risks, benefits, rationale, appropriateness and alternatives to coronary angiography with IV sedation in great detail with the patient.  Complications including but not limited to death, stroke, MI, urgent bypass surgery, contrast nephropathy, vascular complications, bleeding and infection were explained.  In addition, we discussed that 10% of  patients will experience small to moderate bruising at the side of the arterial puncture.  Risks of major complications such as heart attack or stroke caused by the angiogram is less than 1%; the risk of death is approximately 1 in 1000.  The potential outcomes associated with the procedure (possible PCI, possible CABG, possible medical Rx only) were also discussed at length.  Patient voices understanding and with shared decision making, is in agreement to proceed.     We will follow. Please contact me with any questions.     Discussed with the referring physician and bedside nursing.    Please note that this dictation was created using voice recognition software. There may be errors I did not discover before finalizing the note.     James Walker MD, MPH [facility]  Interventional Cardiologist  [facility]   of Clinical Internal Medicine - [facility] Jaylen ROBERTS

## 2023-11-11 NOTE — H&P
Hospital Medicine History & Physical Note    Date of Service  11/10/2023    Primary Care Physician  Bernie Asher M.D.    Consultants      Code Status  Full Code    Chief Complaint  Chief Complaint   Patient presents with    Chest Pressure     Starting this morning - Tx from [place] for NSTEMI       History of Presenting Illness  Sai Lorenzo Jr. is a 73 y.o. male with history of CVA (2009 and 2015), PH, HTN, T2DM (A1C 6.8% 3/2022, down to 5.8% 11/2022), ESRD on HD (failed left arm AV fistula), pAF not on AC, presented with complaints of left arm pain radiating to the left chest over the last 5 days worse with movements.  He was seen at outside hospital where he noted to have troponin 5500 and T wave inversion in pectoral leads.  He was started on heparin and transferred here.  He undergone coronary angiography with stent placement in the mid left anterior descending coronary artery.  He is chest pain-free.  I discussed the plan of care with patient and ERP .    Review of Systems  Review of Systems   Constitutional:  Negative for chills, fever and weight loss.   HENT:  Negative for ear pain, hearing loss and tinnitus.    Eyes:  Negative for blurred vision, double vision and photophobia.   Respiratory:  Negative for cough, hemoptysis and sputum production.    Cardiovascular:  Positive for chest pain. Negative for palpitations and orthopnea.   Gastrointestinal:  Negative for heartburn, nausea and vomiting.   Genitourinary:  Negative for dysuria, flank pain, frequency and hematuria.   Musculoskeletal:  Positive for joint pain. Negative for back pain and neck pain.   Skin:  Negative for itching and rash.   Neurological:  Negative for tremors, speech change, focal weakness and headaches.   Endo/Heme/Allergies:  Negative for environmental allergies and polydipsia. Does not bruise/bleed easily.   Psychiatric/Behavioral:  Negative for hallucinations and substance abuse. The patient is not nervous/anxious.         Past Medical History   has a past medical history of Anemia, Cataract, CVA (cerebral vascular accident) (HCC), Dental disorder, Diabetes (HCC), Dialysis patient (HCC), Fx, Hashimoto's disease, Hashimoto's thyroiditis, High cholesterol, Hypertension, Macular degeneration, and Renal disorder.    Surgical History   has a past surgical history that includes other; cataract extraction with iol; facial fracture orif; and av fistula creation (Left, 5/10/2023).     Family History  family history includes Alcohol abuse in his father and mother; Thyroid in his mother.   Family history reviewed with patient. There is no family history that is pertinent to the chief complaint.     Social History   reports that he has never smoked. He has never used smokeless tobacco. He reports that he does not drink alcohol and does not use drugs.    Allergies  No Known Allergies    Medications  Prior to Admission Medications   Prescriptions Last Dose Informant Patient Reported? Taking?   Cholecalciferol (VITAMIN D3) 2000 UNIT Cap 11/10/2023 at 0700 Family Member Yes No   Sig: Take 4,000 Units by mouth every day.   Multiple Vitamins-Minerals (PRESERVISION AREDS) Cap 11/10/2023 at 0700 Family Member Yes No   Sig: Take 1 Capsule by mouth 2 times a day.   aspirin (ASA) 81 MG Chew Tab chewable tablet 11/10/2023 at am Family Member Yes No   Sig: Chew 81 mg every day. Pt had 4 baby aspirin from [facility] hospital prior to admit   calcium acetate (PHOS-LO) 667 MG Cap 11/10/2023 at 0700 Family Member Yes No   Sig: Take 1,334 mg by mouth every day.   levothyroxine (SYNTHROID) 75 MCG Tab 11/10/2023 at 0700 Family Member Yes No   Sig: Take 75 mcg by mouth every morning on an empty stomach.   linagliptin (TRADJENTA) 5 MG Tab tablet 11/10/2023 at 0700 Family Member Yes No   Sig: Take 5 mg by mouth every day.   pravastatin (PRAVACHOL) 10 MG Tab 11/10/2023 at 0700 Family Member Yes Yes   Sig: Take 10 mg by mouth every day.   tamsulosin (FLOMAX) 0.4  MG capsule 11/10/2023 at 0700 Family Member No No   Sig: Take 1 Capsule by mouth 1/2 hour after breakfast.      Facility-Administered Medications: None       Physical Exam  Temp:  [36.4 °C (97.6 °F)-36.7 °C (98.1 °F)] 36.7 °C (98.1 °F)  Pulse:  [71-76] 72  Resp:  [15-18] 16  BP: (134-170)/(80-99) 170/99  SpO2:  [95 %-98 %] 95 %  Blood Pressure : (!) 170/99   Temperature: 36.7 °C (98.1 °F)   Pulse: 72   Respiration: 16   Pulse Oximetry: 95 %       Physical Exam  Vitals and nursing note reviewed.   Constitutional:       General: He is not in acute distress.     Appearance: Normal appearance.   HENT:      Head: Normocephalic and atraumatic.      Nose: Nose normal.      Mouth/Throat:      Mouth: Mucous membranes are moist.   Eyes:      Extraocular Movements: Extraocular movements intact.      Pupils: Pupils are equal, round, and reactive to light.   Cardiovascular:      Rate and Rhythm: Normal rate and regular rhythm.   Pulmonary:      Effort: Pulmonary effort is normal.      Breath sounds: Normal breath sounds.   Abdominal:      General: Abdomen is flat. There is no distension.      Tenderness: There is no abdominal tenderness.   Musculoskeletal:         General: No swelling or deformity. Normal range of motion.      Cervical back: Normal range of motion and neck supple.   Skin:     General: Skin is warm and dry.   Neurological:      General: No focal deficit present.      Mental Status: He is alert and oriented to person, place, and time.   Psychiatric:         Mood and Affect: Mood normal.         Behavior: Behavior normal.         Laboratory:  Recent Labs     11/10/23  1131 11/10/23  1525   WBC 10.7 11.4*   RBC 3.04* 3.33*   HEMOGLOBIN 10.3* 11.1*   HEMATOCRIT 31.6* 34.8*   .9* 104.5*   MCH 33.9* 33.3*   MCHC 32.6* 31.9*   RDW 11.9 45.0   PLATELETCT 203 214   MPV 9.4 9.6     Recent Labs     11/10/23  1131 11/10/23  1525   SODIUM 140 142   POTASSIUM 5.3* 5.3   CHLORIDE 101 102   CO2 30 25   GLUCOSE 183* 93    BUN 47* 44*   CREATININE 8.4* 7.96*   CALCIUM 8.4* 9.0     Recent Labs     11/10/23  1131 11/10/23  1525   ALTSGPT  --  12   ASTSGOT  --  43   ALKPHOSPHAT  --  98   TBILIRUBIN  --  0.4   GLUCOSE 183* 93     Recent Labs     11/10/23  1131   APTT 26.1     Recent Labs     11/10/23  1525   NTPROBNP 74041*         Recent Labs     11/10/23  1525   TROPONINT 710*       Imaging:  CL-LEFT HEART CATHETERIZATION WITH POSSIBLE INTERVENTION    (Results Pending)   EC-ECHOCARDIOGRAM COMPLETE W/ CONT    (Results Pending)           Assessment/Plan:  Justification for Admission Status  I anticipate this patient will require at least two midnights for appropriate medical management, necessitating inpatient admission because NSTEMI    Patient will need a Telemetry bed on MEDICAL service .  The need is secondary to NSTEMI.    * NSTEMI (non-ST elevated myocardial infarction) (HCC)- (present on admission)  Assessment & Plan  Status post left heart catheterization, coronary angiography with stent placement in the mid left anterior descending coronary artery  Medical optimization per cardiology.  Continue Lipitor, Coreg,  Aspirin and Plavix to start tomorrow  Interim telemetry for arrhythmia  Obtain transthoracic echo    End stage renal disease on dialysis (HCC)  Assessment & Plan  We will consider nephrology consult for dialysis tomorrow    Anemia of chronic kidney failure- (present on admission)  Assessment & Plan  No evidence of bleeding    Benign localized prostatic hyperplasia with lower urinary tract symptoms (LUTS)- (present on admission)  Assessment & Plan  Resume tamsulosin    Essential hypertension- (present on admission)  Assessment & Plan  Continue carvedilol, consider adding second medication  IV labetalol as needed  Monitor blood pressure    Type 2 diabetes mellitus with stage 3b chronic kidney disease, without long-term current use of insulin (HCC)- (present on admission)  Assessment & Plan  Holding Tradjenta  Random blood  sugar 193  Continue sliding scale insulin, repeat A1c        VTE prophylaxis: enoxaparin ppx

## 2023-11-11 NOTE — PROGRESS NOTES
2 1/2hr HD started @ 0958 and completed @ 1229,tx well tolerated,VSS.net UF = 1000ml as ordered.RIJ TDC dressing changed,CDI,report given to Bre HEAD.

## 2023-11-11 NOTE — PROGRESS NOTES
Pt arrived to T826 from cath lab. A&Ox4, RA. Pt has two TR bands inflated to 13cc on right radial. And also has a right groin site CDI, soft. Pt on bed rest. Bed in low and locked position, call light within reach. Will continue to monitor.

## 2023-11-11 NOTE — CONSULTS
[facility] -  CONSULTATION NOTE      DATE & TIME:   11/11/2023  11:58 AM               AUTHOR:  Alex Dawson D.O.      REASON FOR CONSULTATION:   - Inpatient hemodialysis management.      CHIEF COMPLAINT:   -  Chest pain      HISTORY OF PRESENT ILLNESS:    73Y M w ESRD on MWF, hx of CVA, HTN, DM and paroxysmal A-fib (not taking AC) presented with chest pain at [facility], and then was transferred to [facility] for NSTEMI. Pt had LHC done which showed 50% stenosis in RCA, and small LCA with 70% stenosis. He usually gets dialysis at [facility] in [place]. He reports he missed HD yesterday due to the chest pain. Pt seen on HD this AM and tolerating      + Chest Pain. No F/C/N/V//SOB.  No melena, hematochezia, hematemesis.  No HA, visual changes, or abdominal pain.      REVIEW OF SYSTEMS:    10 point ROS was performed and is as per HPI or otherwise negative      PAST MEDICAL HISTORY:   - ESRD  - HTN  - Anemia of CKD  - CKD-MBD  Past Medical History:   Diagnosis Date    Anemia     Cataract     iol bilat    CVA (cerebral vascular accident) ([facility])     2009,2015- left sided weakness    Dental disorder     Full set dentures    Diabetes ([facility])     Dialysis patient ([facility])     Fx     face jaw hand    Hashimoto's disease     Hashimoto's thyroiditis     High cholesterol     Hypertension     Macular degeneration     bilateral    Renal disorder     stage 3b        PAST SURGICAL HISTORY:   - Dialysis Access Surgery  Past Surgical History:   Procedure Laterality Date    AV FISTULA CREATION Left 5/10/2023    Procedure: LEFT UPPER EXTREMITY ARTERIOVENOUS FISTULA CREATION, POSSIBLE RIGHT;  Surgeon: Rashad Dent M.D.;  Location: SURGERY [facility];  Service: General    CATARACT EXTRACTION WITH IOL      FACIAL FRACTURE ORIF      OTHER      facial reconstruction       FAMILY HISTORY:   Family History   Problem Relation Age of Onset    Alcohol abuse Mother     Thyroid Mother     Alcohol abuse Father           SOCIAL HISTORY:  "  Social History     Tobacco Use    Smoking status: Never    Smokeless tobacco: Never   Vaping Use    Vaping Use: Never used   Substance Use Topics    Alcohol use: No    Drug use: No         HOME MEDICATIONS:   - Reviewed and documented in chart  Home Medications    Medication Sig Taking? Last Dose Authorizing Provider   apixaban (ELIQUIS) 5mg Tab Take 1 Tablet by mouth 2 times a day for 30 days. Indications: Thromboembolism secondary to Atrial Fibrillation Yes  Daja Lewis M.D.   atorvastatin (LIPITOR) 80 MG tablet Take 1 Tablet by mouth every evening for 30 days. Yes  Daja Lewis M.D.   carvedilol (COREG) 6.25 MG Tab Take 1 Tablet by mouth 2 times a day with meals for 30 days. Yes  Daja Lewis M.D.   clopidogrel (PLAVIX) 75 MG Tab Take 1 Tablet by mouth every day for 30 days. Yes  Daja Lewis M.D.   pravastatin (PRAVACHOL) 10 MG Tab Take 10 mg by mouth every day. Yes 11/10/2023 at 0700 Physician Outpatient   calcium acetate (PHOS-LO) 667 MG Cap Take 1,334 mg by mouth every day.  11/10/2023 at 0700 Physician Outpatient   Cholecalciferol (VITAMIN D3) 2000 UNIT Cap Take 4,000 Units by mouth every day.  11/10/2023 at 0700 Physician Outpatient   tamsulosin (FLOMAX) 0.4 MG capsule Take 1 Capsule by mouth 1/2 hour after breakfast.  11/10/2023 at 0700 Vickey Nguyen M.D.   Multiple Vitamins-Minerals (PRESERVISION AREDS) Cap Take 1 Capsule by mouth 2 times a day.  11/10/2023 at 0700 Physician Outpatient   levothyroxine (SYNTHROID) 75 MCG Tab Take 75 mcg by mouth every morning on an empty stomach.  11/10/2023 at 0700 Physician Outpatient   linagliptin (TRADJENTA) 5 MG Tab tablet Take 5 mg by mouth every day.  11/10/2023 at 0700 Physician Outpatient         ALLERGIES:  Patient has no known allergies.      VITAL SIGNS:  /83   Pulse 73   Temp 36.5 °C (97.7 °F) (Temporal)   Resp 16   Ht 1.753 m (5' 9\")   Wt 85.1 kg (187 lb 9.8 oz)   SpO2 93%   BMI 27.71 kg/m²     Exam  General: Awake, no acute distress  HEENT: head " normocephalic, atraumatic. Moist mucous membranes  Neck: neck supple, no visible masses  Respiratory: clear to auscultation bilaterally, no rales, no ronchi, or wheezing  Cardiac: normal rate, normal rhythm,  Abdomen: non tender, non-distended, no guarding  Musculoskelatal: no joint tenderness, moves limbs spontaneously  Extremities: no significant joint abnormalities, no edema  Skin: no lesions, no rashes noted    Access: R chest permcath      FLUID BALANCE:  In: 240 [P.O.:240]  Out: -       LABS:  Recent Labs     11/10/23  1131 11/10/23  1525 11/11/23  0226   SODIUM 140 142 137   POTASSIUM 5.3* 5.3 5.5   CHLORIDE 101 102 99   CO2 30 25 24   GLUCOSE 183* 93 123*   BUN 47* 44* 50*   CREATININE 8.4* 7.96* 8.12*   CALCIUM 8.4* 9.0 8.4*      Recent Labs     11/10/23  1131 11/10/23  1525 11/11/23  0226   SODIUM 140 142 137   POTASSIUM 5.3* 5.3 5.5   CHLORIDE 101 102 99   CO2 30 25 24   GLUCOSE 183* 93 123*   BUN 47* 44* 50*   CREATININE 8.4* 7.96* 8.12*          IMAGING:  - All imaging reviewed from admission to present day      ASSESSMENTS:    -ESRD on MWF HD ([facility])     Using permcath  -Hypertension     Continue current medications  -Hypervolemia     Volume off with dialysis as BP tolerates  - Anemia      goal Hgb 10-11      Transfuse PRN hgb < 7  -CKD-MBD  -Chest Pain/CAD     RCA w 50% stensosis, Lcx small, 70% stenosis      PLAN:     - Will perform HD today as pt missed HD  - Pt seen on HD and tolerating  - Volume off with dialysis as BP tolerates  - Continue anti-hypertensive medications  - anemia at goal  -will check phosphorous, cont phoslo  - No dietary protein restrctions     Goal: 1.5g Protein/kg/day    Dispo: will continue qMWF HD after today if pt remains hospitalized    Thank you for this interesting consult and for allowing us to participate in his care.    Will follow with you.     Thank you,

## 2023-11-11 NOTE — PROGRESS NOTES
Notified by Gorge CERRATO that pt needs 2hrs of bedrest. Pt will be off of bedrest at 1945 after arriving to T8 at 1745.

## 2023-11-11 NOTE — ASSESSMENT & PLAN NOTE
Continue carvedilol, consider adding second medication  IV labetalol as needed  Monitor blood pressure

## 2023-11-12 LAB — EKG IMPRESSION: NORMAL

## 2023-11-12 PROCEDURE — 93010 ELECTROCARDIOGRAM REPORT: CPT | Performed by: INTERNAL MEDICINE

## 2023-11-12 NOTE — PROGRESS NOTES
Pt dc'd at 1700. IV and monitor removed; monitor room notified. Pt left unit via wheelchair with family. Personal belongings with pt when leaving unit. Pt given discharge instructions prior to leaving unit including where to  prescriptions and when to follow-up; verbalizes understanding. Copy of discharge instructions with pt and in the chart.

## 2023-12-14 ENCOUNTER — TELEPHONE (OUTPATIENT)
Dept: CARDIOLOGY | Facility: MEDICAL CENTER | Age: 73
End: 2023-12-14

## 2023-12-14 ENCOUNTER — OFFICE VISIT (OUTPATIENT)
Dept: CARDIOLOGY | Facility: MEDICAL CENTER | Age: 73
End: 2023-12-14
Attending: NURSE PRACTITIONER
Payer: MEDICARE

## 2023-12-14 VITALS
WEIGHT: 186.8 LBS | RESPIRATION RATE: 14 BRPM | HEART RATE: 77 BPM | DIASTOLIC BLOOD PRESSURE: 58 MMHG | BODY MASS INDEX: 27.67 KG/M2 | HEIGHT: 69 IN | SYSTOLIC BLOOD PRESSURE: 128 MMHG | OXYGEN SATURATION: 97 %

## 2023-12-14 DIAGNOSIS — Z95.5 STATUS POST CORONARY ARTERY STENT PLACEMENT: ICD-10-CM

## 2023-12-14 DIAGNOSIS — I25.5 ACC/AHA STAGE B SYSTOLIC HEART FAILURE DUE TO ISCHEMIC CARDIOMYOPATHY (HCC): ICD-10-CM

## 2023-12-14 DIAGNOSIS — Z99.2 END STAGE RENAL DISEASE ON DIALYSIS (HCC): ICD-10-CM

## 2023-12-14 DIAGNOSIS — I10 ESSENTIAL HYPERTENSION: ICD-10-CM

## 2023-12-14 DIAGNOSIS — I21.4 NSTEMI (NON-ST ELEVATED MYOCARDIAL INFARCTION) (HCC): ICD-10-CM

## 2023-12-14 DIAGNOSIS — I50.20 ACC/AHA STAGE B SYSTOLIC HEART FAILURE DUE TO ISCHEMIC CARDIOMYOPATHY (HCC): ICD-10-CM

## 2023-12-14 DIAGNOSIS — D68.69 HYPERCOAGULABLE STATE DUE TO PAROXYSMAL ATRIAL FIBRILLATION (HCC): ICD-10-CM

## 2023-12-14 DIAGNOSIS — N18.6 END STAGE RENAL DISEASE ON DIALYSIS (HCC): ICD-10-CM

## 2023-12-14 DIAGNOSIS — N18.32 TYPE 2 DIABETES MELLITUS WITH STAGE 3B CHRONIC KIDNEY DISEASE, WITHOUT LONG-TERM CURRENT USE OF INSULIN (HCC): ICD-10-CM

## 2023-12-14 DIAGNOSIS — E11.22 TYPE 2 DIABETES MELLITUS WITH STAGE 3B CHRONIC KIDNEY DISEASE, WITHOUT LONG-TERM CURRENT USE OF INSULIN (HCC): ICD-10-CM

## 2023-12-14 DIAGNOSIS — I48.0 HYPERCOAGULABLE STATE DUE TO PAROXYSMAL ATRIAL FIBRILLATION (HCC): ICD-10-CM

## 2023-12-14 PROCEDURE — 99214 OFFICE O/P EST MOD 30 MIN: CPT | Performed by: NURSE PRACTITIONER

## 2023-12-14 PROCEDURE — 99213 OFFICE O/P EST LOW 20 MIN: CPT | Performed by: NURSE PRACTITIONER

## 2023-12-14 PROCEDURE — 3074F SYST BP LT 130 MM HG: CPT | Performed by: NURSE PRACTITIONER

## 2023-12-14 PROCEDURE — 3078F DIAST BP <80 MM HG: CPT | Performed by: NURSE PRACTITIONER

## 2023-12-14 RX ORDER — CLOPIDOGREL BISULFATE 75 MG/1
75 TABLET ORAL DAILY
COMMUNITY
End: 2023-12-14 | Stop reason: SDUPTHER

## 2023-12-14 RX ORDER — ATORVASTATIN CALCIUM 80 MG/1
80 TABLET, FILM COATED ORAL NIGHTLY
COMMUNITY
End: 2023-12-14 | Stop reason: SDUPTHER

## 2023-12-14 RX ORDER — CLOPIDOGREL BISULFATE 75 MG/1
75 TABLET ORAL DAILY
Qty: 90 TABLET | Refills: 3 | Status: SHIPPED | OUTPATIENT
Start: 2023-12-14

## 2023-12-14 RX ORDER — CARVEDILOL 6.25 MG/1
6.25 TABLET ORAL 2 TIMES DAILY WITH MEALS
Qty: 180 TABLET | Refills: 3 | Status: SHIPPED | OUTPATIENT
Start: 2023-12-14

## 2023-12-14 RX ORDER — NITROGLYCERIN 0.4 MG/1
0.4 TABLET SUBLINGUAL PRN
Qty: 25 TABLET | Refills: 3 | Status: SHIPPED | OUTPATIENT
Start: 2023-12-14

## 2023-12-14 RX ORDER — CARVEDILOL 6.25 MG/1
6.25 TABLET ORAL 2 TIMES DAILY WITH MEALS
COMMUNITY
End: 2023-12-14 | Stop reason: SDUPTHER

## 2023-12-14 RX ORDER — ATORVASTATIN CALCIUM 80 MG/1
80 TABLET, FILM COATED ORAL EVERY EVENING
Qty: 90 TABLET | Refills: 3 | Status: SHIPPED | OUTPATIENT
Start: 2023-12-14

## 2023-12-14 ASSESSMENT — FIBROSIS 4 INDEX: FIB4 SCORE: 8.25

## 2023-12-14 NOTE — PROGRESS NOTES
Chief Complaint   Patient presents with    MI (Non ST Segment Elevation MI)    Hypertension     Subjective     Sai Lorenzo Jr. is a 73 y.o. male who presents today for hospital follow-up with his granddaughter, Zeniada after NSTEMI on 11/10/2023.  Patient is additional medical problems of CVA (2009 and 2015), PH, HTN, T2DM (A1C 6.8% 3/2022, down to 5.8% 11/2022), ESRD on HD (failed left arm AV fistula), pAF not on AC (ran out with no refills few months ago).    Today, Patient feels well, denies chest pain, shortness of breath, palpitations, dizziness/lightheadedness, orthopnea, PND or Edema. Based on physical examination findings, patient is euvolemic. No JVD, lungs are clear to auscultation, no pitting edema in bilateral lower extremities, no ascites. Dry weight is 186 lbs. Right radial site remains CDI, without any bruising. Patient reports blood pressures generally well-controlled even overcontrolled after dialysis.    Patient is also unsure which Eliquis dose to take either 5 or 2.5 mg.  We discussed patient does not meet criteria for 2.5 mg at this time.  We will continue optimize medical therapy per below.  We discussed cardiac rehab available to patient in [facility] cardiac rehab program may be more accessible as patient lives in [place].  We will have patient follow-up in 3 months with cardiology in the [place] offices.  Past Medical History:   Diagnosis Date    Anemia     Cataract     iol bilat    CVA (cerebral vascular accident) ([facility])     2009,2015- left sided weakness    Dental disorder     Full set dentures    Diabetes ([facility])     Dialysis patient ([facility])     Fx     face jaw hand    Hashimoto's disease     Hashimoto's thyroiditis     High cholesterol     Hypertension     Macular degeneration     bilateral    Renal disorder     stage 3b     Past Surgical History:   Procedure Laterality Date    AV FISTULA CREATION Left 5/10/2023    Procedure: LEFT UPPER EXTREMITY ARTERIOVENOUS FISTULA CREATION,  POSSIBLE RIGHT;  Surgeon: Rashad Dent M.D.;  Location: SURGERY [facility];  Service: General    CATARACT EXTRACTION WITH IOL      FACIAL FRACTURE ORIF      OTHER      facial reconstruction     Family History   Problem Relation Age of Onset    Alcohol abuse Mother     Thyroid Mother     Alcohol abuse Father      Social History     Socioeconomic History    Marital status:      Spouse name: Not on file    Number of children: Not on file    Years of education: Not on file    Highest education level: Not on file   Occupational History    Not on file   Tobacco Use    Smoking status: Never    Smokeless tobacco: Never   Vaping Use    Vaping Use: Never used   Substance and Sexual Activity    Alcohol use: No    Drug use: No    Sexual activity: Not on file   Other Topics Concern    Not on file   Social History Narrative    Not on file     Social Determinants of Health     Financial Resource Strain: Not on file   Food Insecurity: Not on file   Transportation Needs: Not on file   Physical Activity: Not on file   Stress: Not on file   Social Connections: Not on file   Intimate Partner Violence: Not on file   Housing Stability: Not on file     No Known Allergies  Outpatient Encounter Medications as of 12/14/2023   Medication Sig Dispense Refill    apixaban (ELIQUIS) 5mg Tab Take 1 Tablet by mouth 2 times a day. 180 Tablet 3    atorvastatin (LIPITOR) 80 MG tablet Take 1 Tablet by mouth every evening. 90 Tablet 3    carvedilol (COREG) 6.25 MG Tab Take 1 Tablet by mouth 2 times a day with meals. 180 Tablet 3    clopidogrel (PLAVIX) 75 MG Tab Take 1 Tablet by mouth every day. 90 Tablet 3    nitroglycerin (NITROSTAT) 0.4 MG SL Tab Place 1 Tablet under the tongue as needed for Chest Pain (every 5 minutes if chest pain continues, up to 3 doses. Call 911 if no improvement). 25 Tablet 3    calcium acetate (PHOS-LO) 667 MG Cap Take 1,334 mg by mouth every day.      Cholecalciferol (VITAMIN D3) 2000 UNIT Cap Take 4,000 Units by  "mouth every day.      tamsulosin (FLOMAX) 0.4 MG capsule Take 1 Capsule by mouth 1/2 hour after breakfast. 30 Capsule 0    Multiple Vitamins-Minerals (PRESERVISION AREDS) Cap Take 1 Capsule by mouth 2 times a day.      levothyroxine (SYNTHROID) 75 MCG Tab Take 75 mcg by mouth every morning on an empty stomach.      linagliptin (TRADJENTA) 5 MG Tab tablet Take 5 mg by mouth every day.      [DISCONTINUED] apixaban (ELIQUIS) 5mg Tab Take 5 mg by mouth 2 times a day.      [DISCONTINUED] atorvastatin (LIPITOR) 80 MG tablet Take 80 mg by mouth every evening.      [DISCONTINUED] carvedilol (COREG) 6.25 MG Tab Take 6.25 mg by mouth 2 times a day with meals.      [DISCONTINUED] clopidogrel (PLAVIX) 75 MG Tab Take 75 mg by mouth every day.      [DISCONTINUED] pravastatin (PRAVACHOL) 10 MG Tab Take 10 mg by mouth every day. (Patient not taking: Reported on 12/14/2023)       No facility-administered encounter medications on file as of 12/14/2023.     ROS Complete review of systems negative except as noted in HPI/subjective           Objective     /58 (BP Location: Left arm, Patient Position: Sitting, BP Cuff Size: Adult)   Pulse 77   Resp 14   Ht 1.753 m (5' 9\")   Wt 84.7 kg (186 lb 12.8 oz)   SpO2 97%   BMI 27.59 kg/m²     Physical Exam  Vitals reviewed.   Constitutional:       Comments: Te-Moak   Cardiovascular:      Rate and Rhythm: Normal rate and regular rhythm.      Pulses: Normal pulses.      Heart sounds: Normal heart sounds.   Pulmonary:      Effort: Pulmonary effort is normal. No respiratory distress.   Musculoskeletal:      Right lower leg: No edema.      Left lower leg: No edema.   Skin:     General: Skin is warm and dry.   Neurological:      General: No focal deficit present.      Mental Status: He is alert and oriented to person, place, and time.            No results found for: \"CHOLSTRLTOT\", \"LDL\", \"HDL\", \"TRIGLYCERIDE\"    Lab Results   Component Value Date/Time    SODIUM 137 11/11/2023 02:26 AM    " POTASSIUM 5.5 11/11/2023 02:26 AM    CHLORIDE 99 11/11/2023 02:26 AM    CO2 24 11/11/2023 02:26 AM    GLUCOSE 123 (H) 11/11/2023 02:26 AM    BUN 50 (H) 11/11/2023 02:26 AM    CREATININE 8.12 (HH) 11/11/2023 02:26 AM    GLOMRATE 5 (L) 11/30/2022 07:05 AM     Lab Results   Component Value Date/Time    ALKPHOSPHAT 68 11/11/2023 02:26 AM    ASTSGOT 81 (H) 11/11/2023 02:26 AM    ALTSGPT 16 11/11/2023 02:26 AM    TBILIRUBIN 0.3 11/11/2023 02:26 AM      [facility] (11/10/2023):  Coronary arteriograms:  Left main: 20-30% disease in proximal portion.  Left anterior descending: Large calcified vessel, occluded in mid portion.  Left circumflex: Gives first marginal branch in proximal portion, which has mild disease. Mid left circumflex artery is small, has 70% stenosis, supplies smaller marginal branch distally.   Right coronary: 50% stenosis in ostial portion, distal RCA is tortuous and has 50-60% stenosis dominant     Left Heart Catheterization:  Left Ventriculogram: ejection fraction 35%  Left Ventricular EDP: 20 mm Hg   Aortic Valve Gradient: No significant AV gradient noted  Stent: A 4.0 by 16 mm Synergy megatron drug-eluting  stent was deployed in mid  left anterior descending coronary artery     TTE (11/11/2023):  No prior images available for comparison.  Normal LV size, thickness and low-normal LV systolic function with   estimated LVEF 50%  Akinesis of the mid-apical anterior and apical segments suggestive of   underlying ischemic cardiomyopathy - LAD territory.  Normal RV size and systolic function  No significant valvular abnormalities per doppler assessment  Normal IVC size  No pericardial effusion  Assessment & Plan     1. NSTEMI (non-ST elevated myocardial infarction) (HCC)  apixaban (ELIQUIS) 5mg Tab    atorvastatin (LIPITOR) 80 MG tablet    carvedilol (COREG) 6.25 MG Tab    clopidogrel (PLAVIX) 75 MG Tab    nitroglycerin (NITROSTAT) 0.4 MG SL Tab      2. Type 2 diabetes mellitus with stage 3b chronic kidney disease,  without long-term current use of insulin ([facility])  apixaban (ELIQUIS) 5mg Tab    atorvastatin (LIPITOR) 80 MG tablet    carvedilol (COREG) 6.25 MG Tab    clopidogrel (PLAVIX) 75 MG Tab    nitroglycerin (NITROSTAT) 0.4 MG SL Tab      3. End stage renal disease on dialysis (HCC)  apixaban (ELIQUIS) 5mg Tab    atorvastatin (LIPITOR) 80 MG tablet    carvedilol (COREG) 6.25 MG Tab    clopidogrel (PLAVIX) 75 MG Tab    nitroglycerin (NITROSTAT) 0.4 MG SL Tab      4. Status post coronary artery stent placement  apixaban (ELIQUIS) 5mg Tab    atorvastatin (LIPITOR) 80 MG tablet    carvedilol (COREG) 6.25 MG Tab    clopidogrel (PLAVIX) 75 MG Tab    nitroglycerin (NITROSTAT) 0.4 MG SL Tab      5. Essential hypertension        6. Hypercoagulable state due to paroxysmal atrial fibrillation (HCC)        7. ACC/AHA stage B systolic heart failure due to ischemic cardiomyopathy ([facility])            Medical Decision Making: Today's Assessment/Status/Plan:        CAD, NSTEMI, s/p JEET/PCI to [facility] (11/10/2023): Continue DAPT x 1 year  -No asa d/t OAC  -Continue clopidogrel 75 mg daily  -Continue high intensity statin atatorvstatin 80 mg  -No ARB d/t ESRD. Coreg 6.25 mg BID  -EF 50%  -Cardiac rehab referral ordered  -Discussed worsening symptoms of chest pain, patient to go the emergency room. NTG PRN    Paroxysmal Atrial Fibrillation (PAF)  - Asymptomatic, denies AF breakthrough, rate controlled.   -Appears regular on exam today  - On OAC with eliquis, continue.  Does not meet reduced dose criteria at this time only has kidney function.  No age or weight criteria met  - Continue Coreg per above    HTN; HLD; CVA  -Continue optimal secondary prevention medical therapy per above    FU in clinic in 3 months with cardiology in [place]. Sooner if needed.    Patient verbalizes understanding and agrees with the plan of care.     I personally spent a total of 32 minutes which includes face-to-face time and non-face-to-face time spent on preparing  to see the patient, reviewing prior notes and tests, obtaining history from the patient, performing a medically appropriate exam, counseling and educating the patient, ordering medications/tests/procedures/referrals as clinically indicated, and documenting information in the electronic medical record.    MELISA Becerra, -[facility]  [phone]    PLEASE NOTE: This Note was created using voice recognition Software. I have made every reasonable attempt to correct obvious errors, but I expect that there are errors of grammar and possibly content that I did not discover before finalizing the note

## 2023-12-14 NOTE — Clinical Note
Can you schedule this patient a follow-up appointment in [place] with me or Dr. Raphael.  In 3 months? Thank you!

## 2023-12-15 NOTE — PATIENT INSTRUCTIONS
Follow up with [facility] cardiology and Cardiac rehab at [facility]

## 2023-12-15 NOTE — TELEPHONE ENCOUNTER
Called and spoke with Zeinab from pharmacy.  Confirmed patient should be on both medications per JG last OV note

## 2023-12-15 NOTE — TELEPHONE ENCOUNTER
SUNDEEP    Caller: Zeinab calling from [facility]    Topic/issue: Zeinab wants clarification on medications sent over today:    1) clopidogrel (PLAVIX) 75 MG  2) apixaban (ELIQUIS) 5mg Tab     Both are blood thinners and she wants to confirm that they should be taken together, please advise.    Callback Number: see routing comments    Thank you,  Ludmila NICHOLS

## 2024-05-31 ENCOUNTER — TELEPHONE (OUTPATIENT)
Dept: CARDIOLOGY | Facility: MEDICAL CENTER | Age: 74
End: 2024-05-31
Payer: MEDICARE

## 2024-05-31 NOTE — TELEPHONE ENCOUNTER
SUNDEEP  Caller: Mona Zarate RN Case Manager at [facility]    Topic/issue: Patient is having a surgery on 06/07/2024. Patient would like to know if he would need to come into the office for a surgical clearance or if one can be provided to him without having to come in.    Mona also has a question regarding his Plavix. Patient was told to stop Eliquis but there was nothing discussed about the Plavix. Patient would need to stop the medication on Monday, 06/03/2024.  Please call back ASAP to discuss.    Callback Number: [phone]    Thank you,  Luisa CORDERO

## 2024-05-31 NOTE — TELEPHONE ENCOUNTER
Called SONIDO Dunn CM. She also placed pt's PCP Dr. Krishnamurthy on the phone and he relayed concerns regarding pt. They need cardiac clearance and med hold instructions for pt, currently on clopidogrel and Eliquis. They are also concerned that this will be an outpatient procedure and Dr. Krishnamurthy will discuss with Dr. Mejia that due to pt's extensive kidney hx, should be an in-hospital procedure. He is also requesting cardiology to voice the same concern based on pt's extensive cardiac hx. Pt is primary care giver for his spouse who recently had a stroke and also has limited family support after surgery. Advised will complete cardiac clearance protocol and confirm with JG regarding information above to send to PCP and Dr. Mejia (eye surgery). They verbalized understanding and were apprecaitive.    To JG: please review. Pt meets clearance criteria for surgery below, however please confirm ok to place in letter advice to have procedure in-hospital vs outpatient. Thank you!      Last OV: 12/14/23  Proposed Surgery: R eye extraction  Surgery Date: 06/07/24  Requesting Office Name: Dr. Mejia, [facility] and Dr. Krishnamurthy PCP  Fax Number: [phone] (PCP)  Preference of Location (default is surgery center unless specified by Cardiologist or PETER)  Prior Clearance Addressed: No      Anticoags/Antiplatelets: Clopidogrel  and Apixaban   Anticoags/Antiplatelet managed by Cardiology? YES    Outstanding Cardiac Imaging : No  Stent, Cardiac Devices, or Catheterization: No  Ablation, TAVR/Valve (including open heart), Cardioversion: No  Recent Cardiac Hospitalization: No            When: N/A  History (cardiac history):   Past Medical History:   Diagnosis Date    Anemia     Cataract     iol bilat    CVA (cerebral vascular accident) ([facility])     2009,2015- left sided weakness    Dental disorder     Full set dentures    Diabetes ([facility])     Dialysis patient ([facility])     Fx     face jaw hand    Hashimoto's disease     Hashimoto's thyroiditis      High cholesterol     Hypertension     Macular degeneration     bilateral    Renal disorder     stage 3b         Surgical Clearance Letter Sent: No Provider to advise.   **Scan clearance request letter into [facility].**

## 2024-06-04 ENCOUNTER — PRE-ADMISSION TESTING (OUTPATIENT)
Dept: ADMISSIONS | Facility: MEDICAL CENTER | Age: 74
End: 2024-06-04
Attending: OPHTHALMOLOGY
Payer: MEDICARE

## 2024-06-04 RX ORDER — ONDANSETRON 4 MG/1
4 TABLET, ORALLY DISINTEGRATING ORAL EVERY 6 HOURS PRN
COMMUNITY

## 2024-06-04 RX ORDER — PREDNISOLONE ACETATE 10 MG/ML
1 SUSPENSION/ DROPS OPHTHALMIC 3 TIMES DAILY
COMMUNITY
End: 2024-06-07

## 2024-06-04 RX ORDER — LANTHANUM CARBONATE 1000 MG/1
TABLET, CHEWABLE ORAL 3 TIMES DAILY
COMMUNITY
End: 2024-06-07

## 2024-06-04 RX ORDER — MAGNESIUM OXIDE 400 MG/1
400 TABLET ORAL
COMMUNITY
End: 2024-06-07

## 2024-06-04 RX ORDER — BRIMONIDINE TARTRATE 2 MG/ML
1 SOLUTION/ DROPS OPHTHALMIC 3 TIMES DAILY
COMMUNITY
End: 2024-06-07

## 2024-06-04 RX ORDER — DORZOLAMIDE HYDROCHLORIDE AND TIMOLOL MALEATE 20; 5 MG/ML; MG/ML
1 SOLUTION/ DROPS OPHTHALMIC 3 TIMES DAILY
COMMUNITY
End: 2024-06-07

## 2024-06-04 RX ORDER — LATANOPROST 50 UG/ML
1 SOLUTION/ DROPS OPHTHALMIC NIGHTLY
COMMUNITY

## 2024-06-04 NOTE — OR NURSING
"Spoke with Savage and Sai.  Savage will call Dr Mejia to clarify instructions for eye drops.    Mike does not know if he has had blood drawn recently at [facility].  Labs from [facility] are in media as well as EKG.    Savage says that Gibbs \"has not been going to Dialysis regularly because his medicines are working and he does not need to\"    Left message for sister to call if she has any questions regarding surgery instructions.  She was not available during the pre admit visit.    Erasmo and Marylu instructions per Surgeon.        "

## 2024-06-07 ENCOUNTER — HOSPITAL ENCOUNTER (OUTPATIENT)
Facility: MEDICAL CENTER | Age: 74
End: 2024-06-07
Attending: OPHTHALMOLOGY | Admitting: OPHTHALMOLOGY
Payer: MEDICARE

## 2024-06-07 ENCOUNTER — ANESTHESIA EVENT (OUTPATIENT)
Dept: SURGERY | Facility: MEDICAL CENTER | Age: 74
End: 2024-06-07
Payer: MEDICARE

## 2024-06-07 ENCOUNTER — ANESTHESIA (OUTPATIENT)
Dept: SURGERY | Facility: MEDICAL CENTER | Age: 74
End: 2024-06-07
Payer: MEDICARE

## 2024-06-07 ENCOUNTER — HOSPITAL ENCOUNTER (OUTPATIENT)
Facility: MEDICAL CENTER | Age: 74
End: 2024-06-08
Attending: STUDENT IN AN ORGANIZED HEALTH CARE EDUCATION/TRAINING PROGRAM | Admitting: STUDENT IN AN ORGANIZED HEALTH CARE EDUCATION/TRAINING PROGRAM
Payer: MEDICARE

## 2024-06-07 VITALS
RESPIRATION RATE: 13 BRPM | HEART RATE: 72 BPM | HEIGHT: 69 IN | BODY MASS INDEX: 22.37 KG/M2 | SYSTOLIC BLOOD PRESSURE: 180 MMHG | TEMPERATURE: 97.4 F | DIASTOLIC BLOOD PRESSURE: 87 MMHG | OXYGEN SATURATION: 97 % | WEIGHT: 151.01 LBS

## 2024-06-07 DIAGNOSIS — Z91.199 MEDICALLY NONCOMPLIANT: ICD-10-CM

## 2024-06-07 DIAGNOSIS — N18.6 ESRD (END STAGE RENAL DISEASE) (HCC): ICD-10-CM

## 2024-06-07 PROBLEM — E78.5 HYPERLIPIDEMIA: Status: ACTIVE | Noted: 2024-06-07

## 2024-06-07 PROBLEM — Z71.89 ACP (ADVANCE CARE PLANNING): Status: ACTIVE | Noted: 2024-06-07

## 2024-06-07 PROBLEM — E03.9 HYPOTHYROIDISM: Status: ACTIVE | Noted: 2024-06-07

## 2024-06-07 PROBLEM — I16.0 HYPERTENSIVE URGENCY: Status: ACTIVE | Noted: 2024-06-07

## 2024-06-07 PROBLEM — Z99.2 ESRD (END STAGE RENAL DISEASE) ON DIALYSIS (HCC): Status: ACTIVE | Noted: 2024-06-07

## 2024-06-07 PROBLEM — H26.9 CATARACT: Status: ACTIVE | Noted: 2024-06-07

## 2024-06-07 LAB
ALBUMIN SERPL BCP-MCNC: 3.8 G/DL (ref 3.2–4.9)
ALBUMIN/GLOB SERPL: 1.2 G/DL
ALP SERPL-CCNC: 75 U/L (ref 30–99)
ALT SERPL-CCNC: 5 U/L (ref 2–50)
ANION GAP SERPL CALC-SCNC: 22 MMOL/L (ref 7–16)
ANION GAP SERPL CALC-SCNC: 22 MMOL/L (ref 7–16)
AST SERPL-CCNC: 5 U/L (ref 12–45)
BASOPHILS # BLD AUTO: 0.6 % (ref 0–1.8)
BASOPHILS # BLD: 0.04 K/UL (ref 0–0.12)
BILIRUB SERPL-MCNC: 0.8 MG/DL (ref 0.1–1.5)
BUN SERPL-MCNC: 91 MG/DL (ref 8–22)
BUN SERPL-MCNC: 94 MG/DL (ref 8–22)
CALCIUM ALBUM COR SERPL-MCNC: 9 MG/DL (ref 8.5–10.5)
CALCIUM SERPL-MCNC: 8.8 MG/DL (ref 8.5–10.5)
CALCIUM SERPL-MCNC: 8.8 MG/DL (ref 8.5–10.5)
CHLORIDE SERPL-SCNC: 101 MMOL/L (ref 96–112)
CHLORIDE SERPL-SCNC: 102 MMOL/L (ref 96–112)
CO2 SERPL-SCNC: 19 MMOL/L (ref 20–33)
CO2 SERPL-SCNC: 20 MMOL/L (ref 20–33)
CREAT SERPL-MCNC: 15.25 MG/DL (ref 0.5–1.4)
CREAT SERPL-MCNC: 15.49 MG/DL (ref 0.5–1.4)
EOSINOPHIL # BLD AUTO: 0.19 K/UL (ref 0–0.51)
EOSINOPHIL NFR BLD: 2.6 % (ref 0–6.9)
ERYTHROCYTE [DISTWIDTH] IN BLOOD BY AUTOMATED COUNT: 46.9 FL (ref 35.9–50)
GFR SERPLBLD CREATININE-BSD FMLA CKD-EPI: 3 ML/MIN/1.73 M 2
GFR SERPLBLD CREATININE-BSD FMLA CKD-EPI: 3 ML/MIN/1.73 M 2
GLOBULIN SER CALC-MCNC: 3.2 G/DL (ref 1.9–3.5)
GLUCOSE BLD STRIP.AUTO-MCNC: 93 MG/DL (ref 65–99)
GLUCOSE SERPL-MCNC: 101 MG/DL (ref 65–99)
GLUCOSE SERPL-MCNC: 130 MG/DL (ref 65–99)
HCT VFR BLD AUTO: 33.9 % (ref 42–52)
HGB BLD-MCNC: 11 G/DL (ref 14–18)
IMM GRANULOCYTES # BLD AUTO: 0.03 K/UL (ref 0–0.11)
IMM GRANULOCYTES NFR BLD AUTO: 0.4 % (ref 0–0.9)
LYMPHOCYTES # BLD AUTO: 1.72 K/UL (ref 1–4.8)
LYMPHOCYTES NFR BLD: 23.8 % (ref 22–41)
MCH RBC QN AUTO: 33 PG (ref 27–33)
MCHC RBC AUTO-ENTMCNC: 32.4 G/DL (ref 32.3–36.5)
MCV RBC AUTO: 101.8 FL (ref 81.4–97.8)
MONOCYTES # BLD AUTO: 0.82 K/UL (ref 0–0.85)
MONOCYTES NFR BLD AUTO: 11.4 % (ref 0–13.4)
NEUTROPHILS # BLD AUTO: 4.42 K/UL (ref 1.82–7.42)
NEUTROPHILS NFR BLD: 61.2 % (ref 44–72)
NRBC # BLD AUTO: 0 K/UL
NRBC BLD-RTO: 0 /100 WBC (ref 0–0.2)
PLATELET # BLD AUTO: 176 K/UL (ref 164–446)
PMV BLD AUTO: 9.7 FL (ref 9–12.9)
POTASSIUM SERPL-SCNC: 4.4 MMOL/L (ref 3.6–5.5)
POTASSIUM SERPL-SCNC: 4.9 MMOL/L (ref 3.6–5.5)
PROT SERPL-MCNC: 7 G/DL (ref 6–8.2)
RBC # BLD AUTO: 3.33 M/UL (ref 4.7–6.1)
SODIUM SERPL-SCNC: 143 MMOL/L (ref 135–145)
SODIUM SERPL-SCNC: 143 MMOL/L (ref 135–145)
WBC # BLD AUTO: 7.2 K/UL (ref 4.8–10.8)

## 2024-06-07 PROCEDURE — 700105 HCHG RX REV CODE 258: Mod: UD | Performed by: OPHTHALMOLOGY

## 2024-06-07 PROCEDURE — 36415 COLL VENOUS BLD VENIPUNCTURE: CPT

## 2024-06-07 PROCEDURE — A9270 NON-COVERED ITEM OR SERVICE: HCPCS | Mod: UD | Performed by: ANESTHESIOLOGY

## 2024-06-07 PROCEDURE — 96374 THER/PROPH/DIAG INJ IV PUSH: CPT

## 2024-06-07 PROCEDURE — 700111 HCHG RX REV CODE 636 W/ 250 OVERRIDE (IP): Mod: JG,UD | Performed by: STUDENT IN AN ORGANIZED HEALTH CARE EDUCATION/TRAINING PROGRAM

## 2024-06-07 PROCEDURE — 700102 HCHG RX REV CODE 250 W/ 637 OVERRIDE(OP): Mod: UD | Performed by: ANESTHESIOLOGY

## 2024-06-07 PROCEDURE — G0378 HOSPITAL OBSERVATION PER HR: HCPCS

## 2024-06-07 PROCEDURE — 82962 GLUCOSE BLOOD TEST: CPT

## 2024-06-07 PROCEDURE — A9270 NON-COVERED ITEM OR SERVICE: HCPCS | Mod: UD | Performed by: STUDENT IN AN ORGANIZED HEALTH CARE EDUCATION/TRAINING PROGRAM

## 2024-06-07 PROCEDURE — 99285 EMERGENCY DEPT VISIT HI MDM: CPT

## 2024-06-07 PROCEDURE — 700102 HCHG RX REV CODE 250 W/ 637 OVERRIDE(OP): Mod: UD | Performed by: STUDENT IN AN ORGANIZED HEALTH CARE EDUCATION/TRAINING PROGRAM

## 2024-06-07 PROCEDURE — 80053 COMPREHEN METABOLIC PANEL: CPT

## 2024-06-07 PROCEDURE — 96372 THER/PROPH/DIAG INJ SC/IM: CPT

## 2024-06-07 PROCEDURE — 85025 COMPLETE CBC W/AUTO DIFF WBC: CPT

## 2024-06-07 PROCEDURE — 99223 1ST HOSP IP/OBS HIGH 75: CPT | Performed by: STUDENT IN AN ORGANIZED HEALTH CARE EDUCATION/TRAINING PROGRAM

## 2024-06-07 PROCEDURE — 80048 BASIC METABOLIC PNL TOTAL CA: CPT

## 2024-06-07 RX ORDER — LATANOPROST 50 UG/ML
1 SOLUTION/ DROPS OPHTHALMIC NIGHTLY
Status: DISCONTINUED | OUTPATIENT
Start: 2024-06-08 | End: 2024-06-08 | Stop reason: HOSPADM

## 2024-06-07 RX ORDER — EPINEPHRINE 1 MG/ML(1)
AMPUL (ML) INJECTION
Status: DISCONTINUED
Start: 2024-06-07 | End: 2024-06-07 | Stop reason: HOSPADM

## 2024-06-07 RX ORDER — DORZOLAMIDE HYDROCHLORIDE AND TIMOLOL MALEATE 20; 5 MG/ML; MG/ML
1 SOLUTION/ DROPS OPHTHALMIC 3 TIMES DAILY
Status: DISCONTINUED | OUTPATIENT
Start: 2024-06-07 | End: 2024-06-07

## 2024-06-07 RX ORDER — BRIMONIDINE TARTRATE 2 MG/ML
1 SOLUTION/ DROPS OPHTHALMIC 3 TIMES DAILY
Status: DISCONTINUED | OUTPATIENT
Start: 2024-06-07 | End: 2024-06-07

## 2024-06-07 RX ORDER — LIDOCAINE HYDROCHLORIDE 20 MG/ML
INJECTION, SOLUTION INFILTRATION; PERINEURAL
Status: DISCONTINUED
Start: 2024-06-07 | End: 2024-06-07 | Stop reason: HOSPADM

## 2024-06-07 RX ORDER — ACETAMINOPHEN 500 MG
1000 TABLET ORAL ONCE
Status: COMPLETED | OUTPATIENT
Start: 2024-06-07 | End: 2024-06-07

## 2024-06-07 RX ORDER — ATORVASTATIN CALCIUM 80 MG/1
80 TABLET, FILM COATED ORAL EVERY EVENING
Status: DISCONTINUED | OUTPATIENT
Start: 2024-06-07 | End: 2024-06-08 | Stop reason: HOSPADM

## 2024-06-07 RX ORDER — ACETAMINOPHEN 160 MG
4000 TABLET,DISINTEGRATING ORAL DAILY
Status: DISCONTINUED | OUTPATIENT
Start: 2024-06-08 | End: 2024-06-07

## 2024-06-07 RX ORDER — CARVEDILOL 6.25 MG/1
6.25 TABLET ORAL 2 TIMES DAILY WITH MEALS
Status: DISCONTINUED | OUTPATIENT
Start: 2024-06-07 | End: 2024-06-08 | Stop reason: HOSPADM

## 2024-06-07 RX ORDER — BUPIVACAINE HYDROCHLORIDE 5 MG/ML
INJECTION, SOLUTION EPIDURAL; INTRACAUDAL
Status: DISCONTINUED
Start: 2024-06-07 | End: 2024-06-07 | Stop reason: HOSPADM

## 2024-06-07 RX ORDER — ACETAMINOPHEN 325 MG/1
650 TABLET ORAL EVERY 6 HOURS PRN
Status: DISCONTINUED | OUTPATIENT
Start: 2024-06-07 | End: 2024-06-08 | Stop reason: HOSPADM

## 2024-06-07 RX ORDER — LEVOTHYROXINE SODIUM 0.07 MG/1
75 TABLET ORAL
Status: DISCONTINUED | OUTPATIENT
Start: 2024-06-08 | End: 2024-06-08 | Stop reason: HOSPADM

## 2024-06-07 RX ORDER — PREDNISOLONE ACETATE 10 MG/ML
1 SUSPENSION/ DROPS OPHTHALMIC 3 TIMES DAILY
Status: DISCONTINUED | OUTPATIENT
Start: 2024-06-07 | End: 2024-06-07

## 2024-06-07 RX ORDER — ONDANSETRON 4 MG/1
4 TABLET, ORALLY DISINTEGRATING ORAL EVERY 4 HOURS PRN
Status: DISCONTINUED | OUTPATIENT
Start: 2024-06-07 | End: 2024-06-08 | Stop reason: HOSPADM

## 2024-06-07 RX ORDER — CALCIUM ACETATE 667 MG/1
1334 TABLET ORAL DAILY
Status: DISCONTINUED | OUTPATIENT
Start: 2024-06-08 | End: 2024-06-08 | Stop reason: HOSPADM

## 2024-06-07 RX ORDER — BUPIVACAINE HYDROCHLORIDE 2.5 MG/ML
INJECTION, SOLUTION EPIDURAL; INFILTRATION; INTRACAUDAL
Status: DISCONTINUED
Start: 2024-06-07 | End: 2024-06-07 | Stop reason: HOSPADM

## 2024-06-07 RX ORDER — HEPARIN SODIUM 5000 [USP'U]/ML
5000 INJECTION, SOLUTION INTRAVENOUS; SUBCUTANEOUS EVERY 8 HOURS
Status: DISCONTINUED | OUTPATIENT
Start: 2024-06-07 | End: 2024-06-08 | Stop reason: HOSPADM

## 2024-06-07 RX ORDER — SODIUM CHLORIDE 9 MG/ML
INJECTION, SOLUTION INTRAVENOUS ONCE
Status: COMPLETED | OUTPATIENT
Start: 2024-06-07 | End: 2024-06-07

## 2024-06-07 RX ORDER — GENTAMICIN 40 MG/ML
INJECTION, SOLUTION INTRAMUSCULAR; INTRAVENOUS
Status: DISCONTINUED
Start: 2024-06-07 | End: 2024-06-07 | Stop reason: HOSPADM

## 2024-06-07 RX ORDER — LABETALOL HYDROCHLORIDE 5 MG/ML
10 INJECTION, SOLUTION INTRAVENOUS EVERY 4 HOURS PRN
Status: DISCONTINUED | OUTPATIENT
Start: 2024-06-07 | End: 2024-06-08 | Stop reason: HOSPADM

## 2024-06-07 RX ORDER — ONDANSETRON 2 MG/ML
4 INJECTION INTRAMUSCULAR; INTRAVENOUS EVERY 4 HOURS PRN
Status: DISCONTINUED | OUTPATIENT
Start: 2024-06-07 | End: 2024-06-08 | Stop reason: HOSPADM

## 2024-06-07 RX ORDER — TAMSULOSIN HYDROCHLORIDE 0.4 MG/1
0.4 CAPSULE ORAL
Status: DISCONTINUED | OUTPATIENT
Start: 2024-06-08 | End: 2024-06-08 | Stop reason: HOSPADM

## 2024-06-07 RX ADMIN — ATORVASTATIN CALCIUM 80 MG: 80 TABLET, FILM COATED ORAL at 23:14

## 2024-06-07 RX ADMIN — ACETAMINOPHEN 1000 MG: 500 TABLET, FILM COATED ORAL at 12:35

## 2024-06-07 RX ADMIN — SODIUM CHLORIDE: 9 INJECTION, SOLUTION INTRAVENOUS at 13:30

## 2024-06-07 RX ADMIN — HEPARIN SODIUM 5000 UNITS: 5000 INJECTION, SOLUTION INTRAVENOUS; SUBCUTANEOUS at 23:14

## 2024-06-07 RX ADMIN — LABETALOL HYDROCHLORIDE 10 MG: 5 INJECTION, SOLUTION INTRAVENOUS at 22:04

## 2024-06-07 RX ADMIN — CARVEDILOL 6.25 MG: 6.25 TABLET, FILM COATED ORAL at 23:49

## 2024-06-07 ASSESSMENT — LIFESTYLE VARIABLES
EVER FELT BAD OR GUILTY ABOUT YOUR DRINKING: NO
HAVE YOU EVER FELT YOU SHOULD CUT DOWN ON YOUR DRINKING: NO
TOTAL SCORE: 0
ALCOHOL_USE: NO
TOTAL SCORE: 0
HOW MANY TIMES IN THE PAST YEAR HAVE YOU HAD 5 OR MORE DRINKS IN A DAY: 0
EVER HAD A DRINK FIRST THING IN THE MORNING TO STEADY YOUR NERVES TO GET RID OF A HANGOVER: NO
ON A TYPICAL DAY WHEN YOU DRINK ALCOHOL HOW MANY DRINKS DO YOU HAVE: 0
CONSUMPTION TOTAL: NEGATIVE
TOTAL SCORE: 0
AVERAGE NUMBER OF DAYS PER WEEK YOU HAVE A DRINK CONTAINING ALCOHOL: 0
HAVE PEOPLE ANNOYED YOU BY CRITICIZING YOUR DRINKING: NO

## 2024-06-07 ASSESSMENT — FIBROSIS 4 INDEX
FIB4 SCORE: 1.13
FIB4 SCORE: 1.13

## 2024-06-07 ASSESSMENT — PAIN DESCRIPTION - PAIN TYPE: TYPE: CHRONIC PAIN

## 2024-06-07 NOTE — ANESTHESIA PREPROCEDURE EVALUATION
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    Surgeons: Raymond Mejia M.D.          74yo M here for R eye enucleation -- CANCELLED for lack of dialysis, and ssx of volume overload and azotemia, uncontrolled HTN.  Recommended that patient be admitted to the hospital for dialysis and delay surgery until stabilized, but pt refused and wanted to leave hospital and go eat.  We strongly recommended that he go to dialysis on his way home.    Off Plavix and Eliquis x 3 days. Noncompliant with dialysis, normally MWF in [facility], but has NOT had dialysis since last week. BMP in preop showed acidosis with Bicarb 19, Creat >15, BUN 91.     TTE 11/11/23:  Normal LV size, thickness and low-normal LV systolic function with   estimated LVEF 50%  Akinesis of the mid-apical anterior and apical segments suggestive of   underlying ischemic cardiomyopathy - LAD territory.  Normal RV size and systolic function  No significant valvular abnormalities per doppler assessment  Normal IVC size  No pericardial effusion    Relevant Problems   CARDIAC   (positive) Essential hypertension   (positive) NSTEMI (non-ST elevated myocardial infarction) ([facility])         (positive) Acute kidney injury superimposed on CKD ([facility])   (positive) Anemia of chronic kidney failure   (positive) End stage renal disease on dialysis ([facility]) ([facility] dialysis in [place])   (positive) Stage 3b chronic kidney disease      ENDO   (positive) Type 2 diabetes mellitus with stage 3b chronic kidney disease, without long-term current use of insulin (HCC)      Other   (positive) ACC/AHA stage B systolic heart failure due to ischemic cardiomyopathy ([facility])   (positive) History of stroke with residual deficit   (positive) Hypercoagulable state due to paroxysmal atrial fibrillation ([facility])        Physical Exam    Airway   Mallampati: III  TM distance: >3 FB  Neck ROM: full       Cardiovascular - normal exam  Rhythm: regular  Rate: normal  (-) murmur     Dental   (+) lower dentures, upper dentures           Pulmonary - normal exam  Breath sounds clear to auscultation     Abdominal    Neurological - normal exam                   Anesthesia Plan    ASA 3   ASA physical status 3 criteria: CVA or TIA - history (> 3 months)    Plan - general       Airway plan will be LMA          Induction: intravenous    Postoperative Plan: Postoperative administration of opioids is intended.    Pertinent diagnostic labs and testing reviewed    Informed Consent:    Anesthetic plan and risks discussed with patient.    Use of blood products discussed with: patient whom consented to blood products.

## 2024-06-07 NOTE — OR NURSING
MD Mejia and MD García at beside and spoke to patient about dialysis and surgery. Patient's surgery was ultimately cancelled due to his noncompliance with dialysis and high BP, high Cr. Encouraged patient to be admitted to hospital for dialysis and patient refused. MD Mejia instructed patient to go to his dialysis center to be dialyzed and then to f/u with his office to re-schedule. Patient's daughter Cecelia at bedside and verbalized understanding with the patient.

## 2024-06-07 NOTE — ED TRIAGE NOTES
.  Chief Complaint   Patient presents with    Sent by MD     Sent d/t abnormal labs pt has missed a week of dialysis.   Had appointment to have right eye removed today but canceled d/t labs.      To triage by w/c with family.

## 2024-06-08 VITALS
WEIGHT: 151 LBS | DIASTOLIC BLOOD PRESSURE: 78 MMHG | BODY MASS INDEX: 22.36 KG/M2 | HEART RATE: 75 BPM | OXYGEN SATURATION: 95 % | SYSTOLIC BLOOD PRESSURE: 157 MMHG | RESPIRATION RATE: 16 BRPM | HEIGHT: 69 IN | TEMPERATURE: 98 F

## 2024-06-08 PROBLEM — I16.0 HYPERTENSIVE URGENCY: Status: RESOLVED | Noted: 2024-06-07 | Resolved: 2024-06-08

## 2024-06-08 LAB
ANION GAP SERPL CALC-SCNC: 22 MMOL/L (ref 7–16)
BUN SERPL-MCNC: 94 MG/DL (ref 8–22)
CALCIUM SERPL-MCNC: 8.6 MG/DL (ref 8.5–10.5)
CHLORIDE SERPL-SCNC: 102 MMOL/L (ref 96–112)
CO2 SERPL-SCNC: 19 MMOL/L (ref 20–33)
CREAT SERPL-MCNC: 15.03 MG/DL (ref 0.5–1.4)
ERYTHROCYTE [DISTWIDTH] IN BLOOD BY AUTOMATED COUNT: 46.3 FL (ref 35.9–50)
GFR SERPLBLD CREATININE-BSD FMLA CKD-EPI: 3 ML/MIN/1.73 M 2
GLUCOSE SERPL-MCNC: 145 MG/DL (ref 65–99)
HAV IGM SERPL QL IA: NORMAL
HBV CORE IGM SER QL: NORMAL
HBV SURFACE AB SERPL IA-ACNC: <3.5 MIU/ML (ref 0–10)
HBV SURFACE AG SER QL: NORMAL
HCT VFR BLD AUTO: 29.8 % (ref 42–52)
HCV AB SER QL: NORMAL
HGB BLD-MCNC: 9.9 G/DL (ref 14–18)
MCH RBC QN AUTO: 33.4 PG (ref 27–33)
MCHC RBC AUTO-ENTMCNC: 33.2 G/DL (ref 32.3–36.5)
MCV RBC AUTO: 100.7 FL (ref 81.4–97.8)
PLATELET # BLD AUTO: 159 K/UL (ref 164–446)
PMV BLD AUTO: 9.8 FL (ref 9–12.9)
POTASSIUM SERPL-SCNC: 5.3 MMOL/L (ref 3.6–5.5)
RBC # BLD AUTO: 2.96 M/UL (ref 4.7–6.1)
SODIUM SERPL-SCNC: 143 MMOL/L (ref 135–145)
WBC # BLD AUTO: 8.2 K/UL (ref 4.8–10.8)

## 2024-06-08 PROCEDURE — 96372 THER/PROPH/DIAG INJ SC/IM: CPT

## 2024-06-08 PROCEDURE — A9270 NON-COVERED ITEM OR SERVICE: HCPCS | Mod: UD | Performed by: STUDENT IN AN ORGANIZED HEALTH CARE EDUCATION/TRAINING PROGRAM

## 2024-06-08 PROCEDURE — G0378 HOSPITAL OBSERVATION PER HR: HCPCS

## 2024-06-08 PROCEDURE — 700111 HCHG RX REV CODE 636 W/ 250 OVERRIDE (IP): Mod: UD | Performed by: INTERNAL MEDICINE

## 2024-06-08 PROCEDURE — 80048 BASIC METABOLIC PNL TOTAL CA: CPT

## 2024-06-08 PROCEDURE — 700102 HCHG RX REV CODE 250 W/ 637 OVERRIDE(OP): Mod: UD | Performed by: STUDENT IN AN ORGANIZED HEALTH CARE EDUCATION/TRAINING PROGRAM

## 2024-06-08 PROCEDURE — 90935 HEMODIALYSIS ONE EVALUATION: CPT

## 2024-06-08 PROCEDURE — 80074 ACUTE HEPATITIS PANEL: CPT

## 2024-06-08 PROCEDURE — 86706 HEP B SURFACE ANTIBODY: CPT

## 2024-06-08 PROCEDURE — 85027 COMPLETE CBC AUTOMATED: CPT

## 2024-06-08 PROCEDURE — 700111 HCHG RX REV CODE 636 W/ 250 OVERRIDE (IP): Mod: UD

## 2024-06-08 PROCEDURE — 99239 HOSP IP/OBS DSCHRG MGMT >30: CPT | Mod: FS | Performed by: NURSE PRACTITIONER

## 2024-06-08 PROCEDURE — 700111 HCHG RX REV CODE 636 W/ 250 OVERRIDE (IP): Mod: UD | Performed by: STUDENT IN AN ORGANIZED HEALTH CARE EDUCATION/TRAINING PROGRAM

## 2024-06-08 RX ORDER — HEPARIN SODIUM 1000 [USP'U]/ML
1600 INJECTION, SOLUTION INTRAVENOUS; SUBCUTANEOUS
Status: DISCONTINUED | OUTPATIENT
Start: 2024-06-08 | End: 2024-06-08 | Stop reason: HOSPADM

## 2024-06-08 RX ORDER — HEPARIN SODIUM 1000 [USP'U]/ML
INJECTION, SOLUTION INTRAVENOUS; SUBCUTANEOUS
Status: COMPLETED
Start: 2024-06-08 | End: 2024-06-08

## 2024-06-08 RX ORDER — SODIUM CHLORIDE 9 MG/ML
250 INJECTION, SOLUTION INTRAVENOUS
Status: DISCONTINUED | OUTPATIENT
Start: 2024-06-08 | End: 2024-06-08 | Stop reason: HOSPADM

## 2024-06-08 RX ADMIN — HEPARIN SODIUM 1600 UNITS: 1000 INJECTION, SOLUTION INTRAVENOUS; SUBCUTANEOUS at 10:52

## 2024-06-08 RX ADMIN — HEPARIN SODIUM 5000 UNITS: 5000 INJECTION, SOLUTION INTRAVENOUS; SUBCUTANEOUS at 06:07

## 2024-06-08 RX ADMIN — LEVOTHYROXINE SODIUM 75 MCG: 0.07 TABLET ORAL at 06:07

## 2024-06-08 RX ADMIN — TAMSULOSIN HYDROCHLORIDE 0.4 MG: 0.4 CAPSULE ORAL at 07:37

## 2024-06-08 RX ADMIN — Medication 1334 MG: at 07:01

## 2024-06-08 RX ADMIN — CARVEDILOL 6.25 MG: 6.25 TABLET, FILM COATED ORAL at 06:07

## 2024-06-08 SDOH — ECONOMIC STABILITY: TRANSPORTATION INSECURITY
IN THE PAST 12 MONTHS, HAS LACK OF RELIABLE TRANSPORTATION KEPT YOU FROM MEDICAL APPOINTMENTS, MEETINGS, WORK OR FROM GETTING THINGS NEEDED FOR DAILY LIVING?: PATIENT DECLINED

## 2024-06-08 SDOH — ECONOMIC STABILITY: TRANSPORTATION INSECURITY
IN THE PAST 12 MONTHS, HAS THE LACK OF TRANSPORTATION KEPT YOU FROM MEDICAL APPOINTMENTS OR FROM GETTING MEDICATIONS?: PATIENT DECLINED

## 2024-06-08 ASSESSMENT — FIBROSIS 4 INDEX: FIB4 SCORE: 1.05

## 2024-06-08 ASSESSMENT — SOCIAL DETERMINANTS OF HEALTH (SDOH)
IN THE PAST 12 MONTHS, HAS THE ELECTRIC, GAS, OIL, OR WATER COMPANY THREATENED TO SHUT OFF SERVICE IN YOUR HOME?: PATIENT DECLINED
WITHIN THE PAST 12 MONTHS, THE FOOD YOU BOUGHT JUST DIDN'T LAST AND YOU DIDN'T HAVE MONEY TO GET MORE: PATIENT DECLINED
WITHIN THE PAST 12 MONTHS, YOU WORRIED THAT YOUR FOOD WOULD RUN OUT BEFORE YOU GOT THE MONEY TO BUY MORE: PATIENT DECLINED

## 2024-06-08 NOTE — DISCHARGE INSTRUCTIONS
FOLLOW UP ITEMS POST DISCHARGE  Please call [phone] to schedule PCP appointment for patient.    Required specialty appointments include:       Discharge Instructions per [place], [email or web address].    -Follow-up with PCP s/p hospitalization  -Continue all home medications  -Continue established dialysis treatment as indicated; try not to miss any treatments  -Pursue eye surgery as indicated  -Monitor your blood pressures at home    DIET: Renal and cardiac    ACTIVITY: As tolerated    DIAGNOSIS: Missed dialysis, hypertensive urgency    Return to ER if symptoms persist, chest pain, palpitations, shortness of breath, numbness, tingling, weakness, and high fevers.

## 2024-06-08 NOTE — ASSESSMENT & PLAN NOTE
I discussed advance care planning with the patient and family for at least 16 minutes, including diagnosis, prognosis, plan of care, risks and benefits of any therapies that could be offered, as well as alternatives including palliation and hospice, as appropriate.  Patient would like to be full code and to have everything done, including chest compressions and intubation.

## 2024-06-08 NOTE — PROGRESS NOTES
4 Eyes Skin Assessment Completed by SONIDO Villa and SONIDO Kebede.    Head WDL  Ears WDL  Nose WDL  Mouth WDL  Neck WDL  Breast/Chest WDL  Shoulder Blades WDL  Spine WDL  (R) Arm/Elbow/Hand WDL  (L) Arm/Elbow/Hand WDL  Abdomen WDL  Groin WDL  Scrotum/Coccyx/Buttocks WDL  (R) Leg discoloration  (L) Leg discoloration  (R) Heel/Foot/Toe Discoloration, dry, cracked  (L) Heel/Foot/Toe Discoloration, dry, cracked          Devices In Places Tele Box and Pulse Ox      Interventions In Place Pressure Redistribution Mattress    Possible Skin Injury No    Pictures Uploaded Into Epic N/A  Wound Consult Placed N/A  RN Wound Prevention Protocol Ordered No

## 2024-06-08 NOTE — DISCHARGE SUMMARY
Discharge Summary    CHIEF COMPLAINT ON ADMISSION  Chief Complaint   Patient presents with    Sent by MD     Sent d/t abnormal labs pt has missed a week of dialysis.   Had appointment to have right eye removed today but canceled d/t labs.        Reason for Admission  Sent By MD; Dialysis     Admission Date  6/7/2024    CODE STATUS  Full Code    HPI & HOSPITAL COURSE    Mr. Sai Lorenzo Jr. is a 73 y.o. male with a PMHx of ESRD on HD MWF, cataracts, HTN, who presented 6/7/2024 with a need for dialysis.      For the last few weeks, patient has noted progressively worsening pain in his right orbit.  He has tried eyedrops but has not reported any improvement.  He was scheduled to have his right eyeball removed, however he was not able to have surgery due to him having missed his last 3 dialysis appointments due to the pain.  Patient denies chest pain shortness of breath lower extremity swelling weight gain.    In ER, patient noted to have significantly elevated blood pressure 180/87.  Notable findings include RBC 3.33, hemoglobin 11, hematocrit 33.9, CO2 19, anion gap 22.0, glucose 101, BUN 91, creatinine 15.49, GFR 3.  Nephrology Dr. Moss consulted.    During this hospitalization, patient received HD treatment on 6/8.  Recommendations from nephrology post HD include, okay to discharge after HD today, dose all meds for ESRD, continue renal diet, continue home blood pressure meds, continue home Phos binders, and hold REYNA for now.    Patient seen and examined prior to being discharged.  Discussed with patient recommendations from inpatient nephrology.  Patient to continue with HD treatment outpatient and pursue scheduled eye surgery as indicated.  Patient to continue all home medications.  All questions and concerns answered prior to being discharged.  Patient discharged home.       Therefore, he is discharged in good and stable condition to home with close outpatient follow-up.    The patient recovered much more  quickly than anticipated on admission.    Discharge Date  06/08/24      FOLLOW UP ITEMS POST DISCHARGE  Please call [phone] to schedule PCP appointment for patient.    Required specialty appointments include:       Discharge Instructions per THEO Rendon    -Follow-up with PCP s/p hospitalization  -Continue all home medications  -Continue established dialysis treatment as indicated; try not to miss any treatments  -Pursue eye surgery as indicated  -Monitor your blood pressures at home    DIET: Renal and cardiac    ACTIVITY: As tolerated    DIAGNOSIS: Missed dialysis, hypertensive urgency    Return to ER if symptoms persist, chest pain, palpitations, shortness of breath, numbness, tingling, weakness, and high fevers.      DISCHARGE DIAGNOSES  Principal Problem:    End stage renal disease on dialysis (HCC) (POA: Yes)  Active Problems:    Hyperlipidemia (POA: Unknown)    Hypothyroidism (POA: Unknown)    Cataract (POA: Unknown)    ESRD (end stage renal disease) on dialysis (HCC) (POA: Yes)    ACP (advance care planning) (POA: Unknown)  Resolved Problems:    Hypertensive urgency (POA: Unknown)      FOLLOW UP  No future appointments.  Bernie Asher M.D.  [address] [facility] [id number] [phone]    Schedule an appointment as soon as possible for a visit in 1 week(s)        MEDICATIONS ON DISCHARGE     Medication List        CONTINUE taking these medications        Instructions   apixaban 5mg Tabs  Commonly known as: Eliquis   Take 1 Tablet by mouth 2 times a day.  Dose: 5 mg     atorvastatin 80 MG tablet  Commonly known as: Lipitor   Take 1 Tablet by mouth every evening.  Dose: 80 mg     calcium acetate 667 MG Caps  Commonly known as: Phos-Lo   Take 1,334 mg by mouth every day.     DO NOT TAKE DAY OF SURGERY  Dose: 1,334 mg     carvedilol 6.25 MG Tabs  Commonly known as: Coreg   Take 1 Tablet by mouth 2 times a day with meals.  Dose: 6.25 mg     clopidogrel 75 MG  Tabs  Commonly known as: Plavix   Take 1 Tablet by mouth every day.  Dose: 75 mg     latanoprost 0.005 % Soln  Commonly known as: Xalatan   Administer 1 Drop into both eyes every evening.  Dose: 1 Drop     levothyroxine 75 MCG Tabs  Commonly known as: Synthroid   Take 75 mcg by mouth every morning on an empty stomach.     CONTINUE TAKING PRIOR TO SURGERY AND DAY OF SURGERY  Dose: 75 mcg     linagliptin 5 MG Tabs tablet  Commonly known as: Tradjenta   Take 5 mg by mouth every day.     DO NOT TAKE DAY OF SURGERY  Dose: 5 mg     nitroglycerin 0.4 MG Subl  Commonly known as: Nitrostat   Place 1 Tablet under the tongue as needed for Chest Pain (every 5 minutes if chest pain continues, up to 3 doses. Call 911 if no improvement).  Dose: 0.4 mg     ondansetron 4 MG Tbdp  Commonly known as: Zofran ODT   Take 4 mg by mouth every 6 hours as needed for Nausea/Vomiting.     CONTINUE TAKING MED PRIOR TO SURGERY  Dose: 4 mg     PRESERVISION AREDS PO   Take 1 Tablet by mouth 2 times a day.  Dose: 1 Tablet     tamsulosin 0.4 MG capsule  Commonly known as: Flomax   Take 1 Capsule by mouth 1/2 hour after breakfast.  Dose: 0.4 mg              Allergies  No Known Allergies    DIET  Orders Placed This Encounter   Procedures    Diet Order Diet: Regular     Standing Status:   Standing     Number of Occurrences:   1     Order Specific Question:   Diet:     Answer:   Regular [1]       ACTIVITY  As tolerated.  Weight bearing as tolerated    CONSULTATIONS  NONE    PROCEDURES  NONE    IMAGING    No orders to display         LABORATORY  Lab Results   Component Value Date    SODIUM 143 06/08/2024    POTASSIUM 5.3 06/08/2024    CHLORIDE 102 06/08/2024    CO2 19 (L) 06/08/2024    GLUCOSE 145 (H) 06/08/2024    BUN 94 (H) 06/08/2024    CREATININE 15.03 (HH) 06/08/2024    GLOMRATE 5 (L) 11/30/2022        Lab Results   Component Value Date    WBC 8.2 06/08/2024    HEMOGLOBIN 9.9 (L) 06/08/2024    HEMATOCRIT 29.8 (L) 06/08/2024    PLATELETCT 159 (L)  06/08/2024

## 2024-06-08 NOTE — THERAPY
Physical Therapy Contact Note    Patient Name: Sai Lorenzo Jr.  Age:  73 y.o., Sex:  male  Medical Record #: [id number]  Today's Date: 6/8/2024    PT consult received, pt at  this AM, notes reporting dc afterwards; admitted for abnormal labs, last PT eval 5/2024 reporting he lives with family with a ramp and receives assist as needed; likely no acute PT needs however will confirm with pt;     Danuta BRENNAN, PT,  [phone]

## 2024-06-08 NOTE — PROGRESS NOTES
Hemodialysis ordered by Dr. Moss. Pt to restroom prior to tx. Treatment started at 0757 and ended at 1108 d/t pt requested to used restroom. Blood rinsed back to restroom ffor BM. and resumed tx after. Pt stable, vss, no c/o post tx. No UF this tx d/t hypotension episodes and used of restroom. Dr. Moss notified and aware. Report to CHARLI Fischer RN.

## 2024-06-08 NOTE — PROGRESS NOTES
Monitor Summary:  Rhythm: SR   Rate: 71-79  Ectopy: PVC rare     0.16/0.11/0.39    Per monitor tech interpretation

## 2024-06-08 NOTE — CONSULTS
[facility] -  CONSULTATION NOTE               Author: Arun Moss M.D. Date & Time: 6/8/2024  9:22 AM       REASON FOR CONSULTATION:   Inpatient hemodialysis management.    CHIEF COMPLAINT:   Eye pain    HISTORY OF PRESENT ILLNESS:    73 y.o. male with ESRD on iHD MWF in [place] who presented yesterday for R eye enucleation but procedure was cancelled due to missing HD for last week. He notes missing HD for last week due to eye pain. After the surgery getting cancelled he called his HD unit to see if they would dialyze him but they apparently declined due to recent clinical course so he presented to the ED later in the evening and was admitted observation status. Seen on HD this AM without chest pain or SOB. No abd pain, Ongoing eye pain.    REVIEW OF SYSTEMS:    10 point ROS performed, negative other than stated above    PAST MEDICAL HISTORY:   Past Medical History:   Diagnosis Date    Anemia     Cataract     iol bilat    CVA (cerebral vascular accident) ([facility])     2009,2015- left sided weakness    Dental disorder     Full set dentures    Diabetes ([facility])     Dialysis patient ([facility])     DCI in [facility] wed fri    Fx     face jaw hand    Hashimoto's disease     Hashimoto's thyroiditis     High cholesterol     Hypertension     Macular degeneration     bilateral    Renal disorder     stage 3b         PAST SURGICAL HISTORY:   Past Surgical History:   Procedure Laterality Date    AV FISTULA CREATION Left 5/10/2023    Procedure: LEFT UPPER EXTREMITY ARTERIOVENOUS FISTULA CREATION, POSSIBLE RIGHT;  Surgeon: Rasahd Dent M.D.;  Location: SURGERY [facility];  Service: General    CATARACT EXTRACTION WITH IOL      FACIAL FRACTURE ORIF      OTHER      facial reconstruction       FAMILY HISTORY:   No family history of kidney disease    SOCIAL HISTORY:   No smoking, no etoh, no illicit drugs.    HOME MEDICATIONS:   Reviewed and documented in chart    ALLERGIES:  Patient has no known  "allergies.    PHYSICAL EXAM:  VS:  BP (!) 157/78   Pulse 75   Temp 36.7 °C (98 °F) (Temporal)   Resp 16   Ht 1.753 m (5' 9.02\")   Wt 68.5 kg (151 lb)   SpO2 95%   BMI 22.29 kg/m²   GENERAL: frail  CV: No edema  RESP: non-labored  GI: Soft  MSK: No joint deformities   SKIN: No concerning rashes  NEURO: AOx3  PSYCH: Cooperative    LABS:  Recent Results (from the past 24 hour(s))   POCT glucose device results    Collection Time: 06/07/24 12:29 PM   Result Value Ref Range    POC Glucose, Blood 93 65 - 99 mg/dL   BASIC METABOLIC PANEL    Collection Time: 06/07/24 12:30 PM   Result Value Ref Range    Sodium 143 135 - 145 mmol/L    Potassium 4.4 3.6 - 5.5 mmol/L    Chloride 102 96 - 112 mmol/L    Co2 19 (L) 20 - 33 mmol/L    Glucose 101 (H) 65 - 99 mg/dL    Bun 91 (H) 8 - 22 mg/dL    Creatinine 15.49 (HH) 0.50 - 1.40 mg/dL    Calcium 8.8 8.5 - 10.5 mg/dL    Anion Gap 22.0 (H) 7.0 - 16.0   ESTIMATED GFR    Collection Time: 06/07/24 12:30 PM   Result Value Ref Range    GFR (CKD-EPI) 3 (A) >60 mL/min/1.73 m 2   CBC WITH DIFFERENTIAL    Collection Time: 06/07/24  9:10 PM   Result Value Ref Range    WBC 7.2 4.8 - 10.8 K/uL    RBC 3.33 (L) 4.70 - 6.10 M/uL    Hemoglobin 11.0 (L) 14.0 - 18.0 g/dL    Hematocrit 33.9 (L) 42.0 - 52.0 %    .8 (H) 81.4 - 97.8 fL    MCH 33.0 27.0 - 33.0 pg    MCHC 32.4 32.3 - 36.5 g/dL    RDW 46.9 35.9 - 50.0 fL    Platelet Count 176 164 - 446 K/uL    MPV 9.7 9.0 - 12.9 fL    Neutrophils-Polys 61.20 44.00 - 72.00 %    Lymphocytes 23.80 22.00 - 41.00 %    Monocytes 11.40 0.00 - 13.40 %    Eosinophils 2.60 0.00 - 6.90 %    Basophils 0.60 0.00 - 1.80 %    Immature Granulocytes 0.40 0.00 - 0.90 %    Nucleated RBC 0.00 0.00 - 0.20 /100 WBC    Neutrophils (Absolute) 4.42 1.82 - 7.42 K/uL    Lymphs (Absolute) 1.72 1.00 - 4.80 K/uL    Monos (Absolute) 0.82 0.00 - 0.85 K/uL    Eos (Absolute) 0.19 0.00 - 0.51 K/uL    Baso (Absolute) 0.04 0.00 - 0.12 K/uL    Immature Granulocytes (abs) 0.03 0.00 - " 0.11 K/uL    NRBC (Absolute) 0.00 K/uL   Comp Metabolic Panel    Collection Time: 06/07/24  9:10 PM   Result Value Ref Range    Sodium 143 135 - 145 mmol/L    Potassium 4.9 3.6 - 5.5 mmol/L    Chloride 101 96 - 112 mmol/L    Co2 20 20 - 33 mmol/L    Anion Gap 22.0 (H) 7.0 - 16.0    Glucose 130 (H) 65 - 99 mg/dL    Bun 94 (H) 8 - 22 mg/dL    Creatinine 15.25 (HH) 0.50 - 1.40 mg/dL    Calcium 8.8 8.5 - 10.5 mg/dL    Correct Calcium 9.0 8.5 - 10.5 mg/dL    AST(SGOT) 5 (L) 12 - 45 U/L    ALT(SGPT) 5 2 - 50 U/L    Alkaline Phosphatase 75 30 - 99 U/L    Total Bilirubin 0.8 0.1 - 1.5 mg/dL    Albumin 3.8 3.2 - 4.9 g/dL    Total Protein 7.0 6.0 - 8.2 g/dL    Globulin 3.2 1.9 - 3.5 g/dL    A-G Ratio 1.2 g/dL   ESTIMATED GFR    Collection Time: 06/07/24  9:10 PM   Result Value Ref Range    GFR (CKD-EPI) 3 (A) >60 mL/min/1.73 m 2   Basic Metabolic Panel (BMP)    Collection Time: 06/08/24 12:19 AM   Result Value Ref Range    Sodium 143 135 - 145 mmol/L    Potassium 5.3 3.6 - 5.5 mmol/L    Chloride 102 96 - 112 mmol/L    Co2 19 (L) 20 - 33 mmol/L    Glucose 145 (H) 65 - 99 mg/dL    Bun 94 (H) 8 - 22 mg/dL    Creatinine 15.03 (HH) 0.50 - 1.40 mg/dL    Calcium 8.6 8.5 - 10.5 mg/dL    Anion Gap 22.0 (H) 7.0 - 16.0   CBC without Differential    Collection Time: 06/08/24 12:19 AM   Result Value Ref Range    WBC 8.2 4.8 - 10.8 K/uL    RBC 2.96 (L) 4.70 - 6.10 M/uL    Hemoglobin 9.9 (L) 14.0 - 18.0 g/dL    Hematocrit 29.8 (L) 42.0 - 52.0 %    .7 (H) 81.4 - 97.8 fL    MCH 33.4 (H) 27.0 - 33.0 pg    MCHC 33.2 32.3 - 36.5 g/dL    RDW 46.3 35.9 - 50.0 fL    Platelet Count 159 (L) 164 - 446 K/uL    MPV 9.8 9.0 - 12.9 fL   ESTIMATED GFR    Collection Time: 06/08/24 12:19 AM   Result Value Ref Range    GFR (CKD-EPI) 3 (A) >60 mL/min/1.73 m 2       (click the triangle to expand results)    IMAGING:  No orders to display       ASSESSMENT:  # ESRD: normally M/W/F in [place].Missed for last week  # Anemia in CKD:  # CKD-MBD:  # Eye  pain: pending surgical intervention   # HTN: off target    PLAN:  -iHD today, then to continue M/W/F   -OK for discharge after HD today  -Dose all meds for ESRD  -Renal Diet  -Continue home blood pressure meds  -Continue home phos binders  -Holding REYNA for now    Thank you for this consult, we will continue to follow.    Arun Moss MD

## 2024-06-08 NOTE — H&P
Hospital Medicine History & Physical Note    Date of Service  6/7/2024    Primary Care Physician  Bernie Asher M.D.    Consultants  Nephrology    Code Status  Full Code    Chief Complaint  Chief Complaint   Patient presents with    Sent by MD     Sent d/t abnormal labs pt has missed a week of dialysis.   Had appointment to have right eye removed today but canceled d/t labs.        History of Presenting Illness  Sai Lorenzo Jr. is a 73 y.o. male who presented 6/7/2024 with a need for dialysis.  Patient has history of end-stage renal disease on hemodialysis and cataracts.  For the last few weeks, patient has noted progressively worsening pain in his right orbit.  He has tried eyedrops but has not reported any improvement.  He was scheduled to have his right eyeball removed, however he was not able to have surgery due to him having missed his last 3 dialysis appointments due to the pain.  Patient denies chest pain shortness of breath lower extremity swelling weight gain.    I discussed the plan of care with patient.    Review of Systems  ROS    Past Medical History   has a past medical history of Anemia, Cataract, CVA (cerebral vascular accident) (HCC), Dental disorder, Diabetes (HCC), Dialysis patient (HCC), Fx, Hashimoto's disease, Hashimoto's thyroiditis, High cholesterol, Hypertension, Macular degeneration, and Renal disorder.    Surgical History   has a past surgical history that includes other; cataract extraction with iol; facial fracture orif; and av fistula creation (Left, 5/10/2023).     Family History  family history includes Alcohol abuse in his father and mother; Thyroid in his mother.   Family history reviewed with patient. There is no family history that is pertinent to the chief complaint.     Social History   reports that he has never smoked. He has never used smokeless tobacco. He reports that he does not drink alcohol and does not use drugs.    Allergies  No Known Allergies    Medications  Prior  to Admission Medications   Prescriptions Last Dose Informant Patient Reported? Taking?   Multiple Vitamins-Minerals (PRESERVISION AREDS PO) UNK at K Patient Yes Yes   Sig: Take 1 Tablet by mouth 2 times a day.   apixaban (ELIQUIS) 5mg Tab 6/4/2024 at [facility] Patient No No   Sig: Take 1 Tablet by mouth 2 times a day.   atorvastatin (LIPITOR) 80 MG tablet 6/6/2024 at PM Patient No Yes   Sig: Take 1 Tablet by mouth every evening.   calcium acetate (PHOS-LO) 667 MG Cap UNK at K Patient Yes No   Sig: Take 1,334 mg by mouth every day.     DO NOT TAKE DAY OF SURGERY   carvedilol (COREG) 6.25 MG Tab 6/6/2024 at K Patient No Yes   Sig: Take 1 Tablet by mouth 2 times a day with meals.   clopidogrel (PLAVIX) 75 MG Tab 6/3/2024 at [facility] Patient No No   Sig: Take 1 Tablet by mouth every day.   latanoprost (XALATAN) 0.005 % Solution 6/6/2024 at PM Patient Yes Yes   Sig: Administer 1 Drop into both eyes every evening.   levothyroxine (SYNTHROID) 75 MCG Tab 6/7/2024 at AM Patient Yes Yes   Sig: Take 75 mcg by mouth every morning on an empty stomach.     CONTINUE TAKING PRIOR TO SURGERY AND DAY OF SURGERY   linagliptin (TRADJENTA) 5 MG Tab tablet UNK at K Patient Yes No   Sig: Take 5 mg by mouth every day.     DO NOT TAKE DAY OF SURGERY   nitroglycerin (NITROSTAT) 0.4 MG SL Tab PRN at PRN Patient No No   Sig: Place 1 Tablet under the tongue as needed for Chest Pain (every 5 minutes if chest pain continues, up to 3 doses. Call 911 if no improvement).   ondansetron (ZOFRAN ODT) 4 MG TABLET DISPERSIBLE UNK at K Patient Yes No   Sig: Take 4 mg by mouth every 6 hours as needed for Nausea/Vomiting.     CONTINUE TAKING MED PRIOR TO SURGERY   tamsulosin (FLOMAX) 0.4 MG capsule UNK at K Patient No No   Sig: Take 1 Capsule by mouth 1/2 hour after breakfast.      Facility-Administered Medications: None       Physical Exam  Temp:  [36.3 °C (97.4 °F)-36.9 °C (98.5 °F)] 36.9 °C (98.5 °F)  Pulse:  [68-75] 68  Resp:  [13-18] 16  BP:  "(165-184)/(83-89) 181/87  SpO2:  [96 %-98 %] 97 %  Blood Pressure : (!) 181/87   Temperature: 36.9 °C (98.5 °F)   Pulse: 68   Respiration: 16   Pulse Oximetry: 97 %       Physical Exam  Constitutional:       Appearance: Normal appearance. He is normal weight.   HENT:      Head: Normocephalic.      Nose: Nose normal.      Mouth/Throat:      Mouth: Mucous membranes are moist.   Eyes:      Comments: Cataracts on patient's right eye   Cardiovascular:      Rate and Rhythm: Normal rate and regular rhythm.      Comments: Right-sided tunneled dialysis catheter  Pulmonary:      Effort: Pulmonary effort is normal.      Breath sounds: Normal breath sounds.   Abdominal:      General: Abdomen is flat. Bowel sounds are normal.      Palpations: Abdomen is soft.   Musculoskeletal:         General: No swelling. Normal range of motion.      Cervical back: Neck supple.   Skin:     General: Skin is warm.   Neurological:      General: No focal deficit present.      Mental Status: He is alert and oriented to person, place, and time. Mental status is at baseline.   Psychiatric:         Mood and Affect: Mood normal.         Behavior: Behavior normal.         Thought Content: Thought content normal.         Judgment: Judgment normal.         Laboratory:  Recent Labs     06/07/24 2110   WBC 7.2   RBC 3.33*   HEMOGLOBIN 11.0*   HEMATOCRIT 33.9*   .8*   MCH 33.0   MCHC 32.4   RDW 46.9   PLATELETCT 176   MPV 9.7     Recent Labs     06/07/24  1230 06/07/24 2110   SODIUM 143 143   POTASSIUM 4.4 4.9   CHLORIDE 102 101   CO2 19* 20   GLUCOSE 101* 130*   BUN 91* 94*   CREATININE 15.49* 15.25*   CALCIUM 8.8 8.8     Recent Labs     06/07/24  1230 06/07/24  2110   ALTSGPT  --  5   ASTSGOT  --  5*   ALKPHOSPHAT  --  75   TBILIRUBIN  --  0.8   GLUCOSE 101* 130*         No results for input(s): \"NTPROBNP\" in the last 72 hours.      No results for input(s): \"TROPONINT\" in the last 72 hours.    Imaging:  No orders to display       no X-Ray or EKG " requiring interpretation    Assessment/Plan:  Justification for Admission Status  I anticipate this patient is appropriate for observation status at this time because pt will have dialysis in am    Patient will need a Telemetry bed on MEDICAL service .  The need is secondary to esrd on hd.    * End stage renal disease on dialysis (HCC)- (present on admission)  Assessment & Plan  Has missed 3 sessions of hemodialysis. No urgent need for dialysis overnight  Nephrology consulted, can have dialysis in the morning    ACP (advance care planning)  Assessment & Plan  I discussed advance care planning with the patient and family for at least 16 minutes, including diagnosis, prognosis, plan of care, risks and benefits of any therapies that could be offered, as well as alternatives including palliation and hospice, as appropriate.  Patient would like to be full code and to have everything done, including chest compressions and intubation.      Cataract  Assessment & Plan  Following outpt. Will have surgery soon once cleared.    Hypertensive urgency  Assessment & Plan  Resume home medications  Labetalol IV prn    Hypothyroidism  Assessment & Plan  Synthroid    Hyperlipidemia  Assessment & Plan  Lipitor        VTE prophylaxis: heparin ppx

## 2024-06-08 NOTE — HOSPITAL COURSE
Mr. Sai Lorenzo Jr. is a 73 y.o. male with a PMHx of ESRD on HD MWF, cataracts, HTN, who presented 6/7/2024 with a need for dialysis.      For the last few weeks, patient has noted progressively worsening pain in his right orbit.  He has tried eyedrops but has not reported any improvement.  He was scheduled to have his right eyeball removed, however he was not able to have surgery due to him having missed his last 3 dialysis appointments due to the pain.  Patient denies chest pain shortness of breath lower extremity swelling weight gain.    In ER, patient noted to have significantly elevated blood pressure 180/87.  Notable findings include RBC 3.33, hemoglobin 11, hematocrit 33.9, CO2 19, anion gap 22.0, glucose 101, BUN 91, creatinine 15.49, GFR 3.  Nephrology Dr. Moss consulted.    During this hospitalization, patient received HD treatment on 6/8.  Recommendations from nephrology post HD include, okay to discharge after HD today, dose all meds for ESRD, continue renal diet, continue home blood pressure meds, continue home Phos binders, and hold REYNA for now.    Patient seen and examined prior to being discharged.  Discussed with patient recommendations from inpatient nephrology.  Patient to continue with HD treatment outpatient and pursue scheduled eye surgery as indicated.  Patient to continue all home medications.  All questions and concerns answered prior to being discharged.  Patient discharged home.

## 2024-06-08 NOTE — ASSESSMENT & PLAN NOTE
Has missed 3 sessions of hemodialysis. No urgent need for dialysis overnight  Nephrology consulted, can have dialysis in the morning

## 2024-06-08 NOTE — PROGRESS NOTES
Report received from night shift RN. Patient A&O, in bed resting comfortably. VSS, denies pain, bed in lowest position and locked, call light in reach.    [Suicidal] : not suicidal [Insomnia] : no insomnia [Anxiety] : no anxiety [Depression] : depression [Negative] : Respiratory

## 2024-06-08 NOTE — CARE PLAN
Problem: Pain - Standard  Goal: Alleviation of pain or a reduction in pain to the patient’s comfort goal  Outcome: Progressing  Note: POC regarding pain management discussed with pt.  Pt will be medicated for pain per MAR       Problem: Knowledge Deficit - Standard  Goal: Patient and family/care givers will demonstrate understanding of plan of care, disease process/condition, diagnostic tests and medications  Outcome: Progressing  Note: Discussed POC and medications with patient.  Patient verbalized understanding.     The patient is Stable - Low risk of patient condition declining or worsening    Shift Goals  Clinical Goals: Dialysis  Patient Goals: Rest

## 2024-06-08 NOTE — ED PROVIDER NOTES
CHIEF COMPLAINT  Chief Complaint   Patient presents with    Sent by MD     Sent d/t abnormal labs pt has missed a week of dialysis.   Had appointment to have right eye removed today but canceled d/t labs.        LIMITATION TO HISTORY   Select:     ОЛЬГА Lorenzo Jr. is a 73 y.o. male who presents to the Emergency Department for evaluation after missing dialysis for a week patient was supposed to get an outpatient procedure of right eye enucleation for end-stage glaucoma the surgery was canceled after it was discovered that he had missed dialysis for a week he had a CMP done at 12:30 PM he reached out to his dialysis center he said they could not arrange a chair and recommended he come to the ER he denies symptoms denies shortness of breath    OUTSIDE HISTORIAN(S):  Select:    EXTERNAL RECORDS REVIEWED  Select:       PAST MEDICAL HISTORY  Past Medical History:   Diagnosis Date    Anemia     Cataract     iol bilat    CVA (cerebral vascular accident) ([facility])     2009,2015- left sided weakness    Dental disorder     Full set dentures    Diabetes ([facility])     Dialysis patient ([facility])     DCI in [facility]     face jaw hand    Hashimoto's disease     Hashimoto's thyroiditis     High cholesterol     Hypertension     Macular degeneration     bilateral    Renal disorder     stage 3b     .    SURGICAL HISTORY  Past Surgical History:   Procedure Laterality Date    AV FISTULA CREATION Left 5/10/2023    Procedure: LEFT UPPER EXTREMITY ARTERIOVENOUS FISTULA CREATION, POSSIBLE RIGHT;  Surgeon: Rashad Dent M.D.;  Location: SURGERY [facility];  Service: General    CATARACT EXTRACTION WITH IOL      FACIAL FRACTURE ORIF      OTHER      facial reconstruction         FAMILY HISTORY  Family History   Problem Relation Age of Onset    Alcohol abuse Mother     Thyroid Mother     Alcohol abuse Father           SOCIAL HISTORY  Social History     Socioeconomic History    Marital status:      Spouse name: Not on  file    Number of children: Not on file    Years of education: Not on file    Highest education level: Not on file   Occupational History    Not on file   Tobacco Use    Smoking status: Never    Smokeless tobacco: Never   Vaping Use    Vaping status: Never Used   Substance and Sexual Activity    Alcohol use: No    Drug use: No    Sexual activity: Not on file   Other Topics Concern    Not on file   Social History Narrative    Not on file     Social Determinants of Health     Financial Resource Strain: Not on file   Food Insecurity: Low Risk  (5/22/2024)    Received from [facility]    SINCERE Food Insecurity     In the last month, did you feel there was not enough money for food?: No   Transportation Needs: Low Risk  (5/22/2024)    Received from [facility]    SINCERE Transportation Needs     In the last month, have you not seen a doctor because you didn't have a way to get to the clinic or hospital?: No   Physical Activity: Not on file   Stress: Not on file   Social Connections: Not on file   Intimate Partner Violence: Not At Risk (6/7/2024)    Humiliation, Afraid, Rape, and Kick questionnaire     Fear of Current or Ex-Partner: No     Emotionally Abused: No     Physically Abused: No     Sexually Abused: No   Housing Stability: Low Risk  (5/22/2024)    Received from [facility]    SINCERE Housing Needs     In the last month, was there a time when you were not able to pay your mortgage or rent?: No     In the last month, have you slept outside, in a shelter, in a car, or any place not meant for sleeping?: Not on file         CURRENT MEDICATIONS  No current facility-administered medications on file prior to encounter.     Current Outpatient Medications on File Prior to Encounter   Medication Sig Dispense Refill    Multiple Vitamins-Minerals (PRESERVISION AREDS PO) Take 1 Tablet by mouth 2 times a day.      latanoprost (XALATAN) 0.005 % Solution Administer 1 Drop into both eyes  "every evening.      atorvastatin (LIPITOR) 80 MG tablet Take 1 Tablet by mouth every evening. 90 Tablet 3    carvedilol (COREG) 6.25 MG Tab Take 1 Tablet by mouth 2 times a day with meals. 180 Tablet 3    levothyroxine (SYNTHROID) 75 MCG Tab Take 75 mcg by mouth every morning on an empty stomach.     CONTINUE TAKING PRIOR TO SURGERY AND DAY OF SURGERY      ondansetron (ZOFRAN ODT) 4 MG TABLET DISPERSIBLE Take 4 mg by mouth every 6 hours as needed for Nausea/Vomiting.     CONTINUE TAKING MED PRIOR TO SURGERY      apixaban (ELIQUIS) 5mg Tab Take 1 Tablet by mouth 2 times a day. 180 Tablet 3    clopidogrel (PLAVIX) 75 MG Tab Take 1 Tablet by mouth every day. 90 Tablet 3    nitroglycerin (NITROSTAT) 0.4 MG SL Tab Place 1 Tablet under the tongue as needed for Chest Pain (every 5 minutes if chest pain continues, up to 3 doses. Call 911 if no improvement). 25 Tablet 3    calcium acetate (PHOS-LO) 667 MG Cap Take 1,334 mg by mouth every day.     DO NOT TAKE DAY OF SURGERY      tamsulosin (FLOMAX) 0.4 MG capsule Take 1 Capsule by mouth 1/2 hour after breakfast. 30 Capsule 0    linagliptin (TRADJENTA) 5 MG Tab tablet Take 5 mg by mouth every day.     DO NOT TAKE DAY OF SURGERY             ALLERGIES  No Known Allergies    PHYSICAL EXAM  VITAL SIGNS:BP (!) 169/94   Pulse 80   Temp 36.2 °C (97.2 °F) (Temporal)   Resp 16   Ht 1.753 m (5' 9.02\")   Wt 68.5 kg (151 lb)   SpO2 97%   BMI 22.29 kg/m²       GENERAL: Awake and alert  HEAD: Normocephalic and atraumatic  NECK: Normal range of motion, without meningismus  EYES: Pupils Equal, Round, Reactive to Light, extraocular movements intact, conjunctiva white  ENT: Mucous membranes moist, oropharynx clear  PULMONARY: Normal effort, clear to auscultation  CARDIOVASCULAR: No murmurs, clicks or rubs, peripheral pulses 2+  ABDOMINAL: Soft, non-tender, no guarding or rigidity present, no pulsatile masses  BACK: no midline tenderness, no costovertebral tenderness  NEUROLOGICAL: " Grossly non-focal neurological examination, speech normal, gait normal  EXTREMITIES: No edema, normal to inspection  SKIN: Warm and dry.  PSYCHIATRIC: Affect is appropriate        LABS  Labs Reviewed   CBC WITH DIFFERENTIAL - Abnormal; Notable for the following components:       Result Value    RBC 3.33 (*)     Hemoglobin 11.0 (*)     Hematocrit 33.9 (*)     .8 (*)     All other components within normal limits   COMP METABOLIC PANEL - Abnormal; Notable for the following components:    Anion Gap 22.0 (*)     Glucose 130 (*)     Bun 94 (*)     Creatinine 15.25 (*)     AST(SGOT) 5 (*)     All other components within normal limits   ESTIMATED GFR - Abnormal; Notable for the following components:    GFR (CKD-EPI) 3 (*)     All other components within normal limits   BASIC METABOLIC PANEL   CBC WITHOUT DIFFERENTIAL           COURSE & MEDICAL DECISION MAKING    ED COURSE:        INTERVENTIONS BY ME:  Medications   atorvastatin (Lipitor) tablet 80 mg (80 mg Oral Given 6/7/24 2314)   calcium acetate (Phos-Lo) 667 MG tablet 1,334 mg (has no administration in time range)   carvedilol (Coreg) tablet 6.25 mg (6.25 mg Oral Given 6/7/24 2349)   latanoprost (Xalatan) 0.005 % ophthalmic solution 1 Drop (has no administration in time range)   levothyroxine (Synthroid) tablet 75 mcg (has no administration in time range)   tamsulosin (Flomax) capsule 0.4 mg (has no administration in time range)   heparin injection 5,000 Units (5,000 Units Subcutaneous Given 6/7/24 2314)   acetaminophen (Tylenol) tablet 650 mg (has no administration in time range)   ondansetron (Zofran) syringe/vial injection 4 mg (has no administration in time range)   ondansetron (Zofran ODT) dispertab 4 mg (has no administration in time range)   labetalol (Normodyne/Trandate) injection 10 mg (10 mg Intravenous Given 6/7/24 2204)       Response on recheck:      INITIAL ASSESSMENT, COURSE AND PLAN  Care Narrative:   Patient presenting with end-stage renal  disease after missing dialysis for 7 days given his kidney function and BUN level was admitted to the hospitalist plan for dialysis tomorrow morning his potassium level is normal feel he is safe to await dialysis until the morning  Consider obtaining chest radiograph to screen for pulmonary edema however he has a normal pulmonary examination denies shortness of breath and has a normal pulse oximeter level     ADDITIONAL PROBLEM LIST    DISPOSITION AND DISCUSSIONS  Discussion of management with other QHP or appropriate source(s): None     I have discussed management of the patient with the following physicians and PETER's: I disccused the mangament and decision to admit this patient with the Hospitalist. Dr. Rosario  Spoke to nephrologist Dr. Royal    Escalation of care considered, and ultimately not performed:diagnostic imaging    \    FINAL DIAGNOSIS  1. ESRD (end stage renal disease) (HCC)    2. Medically noncompliant             Electronically signed by: Khoa Sellers DO ,12:36 AM 06/08/24

## 2024-06-08 NOTE — ED NOTES
Med Rec complete per patient   Allergies reviewed  Antibiotics in the past 30 days:no  Anticoagulant in past 14 days:yes  Anticoagulant:Eliquis 5 mg Last dose:06/04/24  Pharmacy patient utilizes:[facility] in [place]    Pt does dialysis in [place] M/W/F. Did not do dialysis today. Facility closed at this time.    Pt states he is only using one eye drop at home and it's taken in the evening.    Pt was unable to verify last doses of most medications    Addendum: spoke with Dialysis staff and per staff pt has had 2 partial dialysis done in last 3 weeks. Per staff pt is supposed to receive 1,200 units of Retacrit 3x/week. Staff unable to give me last dose given. Notified pharmacist

## 2024-06-08 NOTE — ED NOTES
Bedside report received from off going RN/tech: Farheen, assumed care of patient.  POC discussed with patient. Call light within reach, all needs addressed at this time.       Fall risk interventions in place: Move the patient closer to the nurse's station, Patient's personal possessions are with in their safe reach, Place socks on patient, Place fall risk sign on patient's door, Give patient urinal if applicable, Keep floor surfaces clean and dry, and Accompanied to restroom (all applicable per [facility] Fall risk assessment)   Continuous monitoring: Cardiac Leads, Pulse Ox, or Blood Pressure  IVF/IV medications: Not Applicable   Oxygen: Room Air  Bedside sitter: Not Applicable   Isolation: Not Applicable    BP (!) 165/83   Pulse 75   Temp 36.9 °C (98.5 °F) (Temporal)   Resp 18   Wt 68.5 kg (151 lb)   SpO2 98%  - RA

## 2024-06-08 NOTE — ED NOTES
Patient and all belongings transferred to T203-00 by floor RN.    BP (!) 181/87   Pulse 68   Temp 36.9 °C (98.5 °F) (Temporal)   Resp 16   Wt 68.5 kg (151 lb)   SpO2 97%  - RA

## 2024-06-10 ENCOUNTER — HOSPITAL ENCOUNTER (OUTPATIENT)
Facility: MEDICAL CENTER | Age: 74
End: 2024-06-10
Attending: OPHTHALMOLOGY | Admitting: OPHTHALMOLOGY
Payer: MEDICARE

## 2024-06-14 RX ORDER — SODIUM CHLORIDE 9 MG/ML
INJECTION, SOLUTION INTRAVENOUS ONCE
OUTPATIENT
Start: 2024-06-14 | End: 2024-06-14

## 2024-07-17 ENCOUNTER — OFFICE VISIT (OUTPATIENT)
Dept: CARDIOLOGY | Facility: MEDICAL CENTER | Age: 74
End: 2024-07-17
Attending: NURSE PRACTITIONER
Payer: MEDICARE

## 2024-07-17 VITALS
SYSTOLIC BLOOD PRESSURE: 118 MMHG | DIASTOLIC BLOOD PRESSURE: 66 MMHG | HEART RATE: 78 BPM | RESPIRATION RATE: 14 BRPM | HEIGHT: 69 IN | BODY MASS INDEX: 22.74 KG/M2 | OXYGEN SATURATION: 99 %

## 2024-07-17 DIAGNOSIS — I69.30 HISTORY OF STROKE WITH RESIDUAL DEFICIT: ICD-10-CM

## 2024-07-17 DIAGNOSIS — I25.10 CORONARY ARTERY DISEASE INVOLVING NATIVE CORONARY ARTERY OF NATIVE HEART WITHOUT ANGINA PECTORIS: ICD-10-CM

## 2024-07-17 DIAGNOSIS — N18.30 ANEMIA OF CHRONIC KIDNEY FAILURE, STAGE 3 (MODERATE): ICD-10-CM

## 2024-07-17 DIAGNOSIS — D63.1 ANEMIA OF CHRONIC KIDNEY FAILURE, STAGE 3 (MODERATE): ICD-10-CM

## 2024-07-17 DIAGNOSIS — I10 ESSENTIAL HYPERTENSION: ICD-10-CM

## 2024-07-17 PROCEDURE — 3074F SYST BP LT 130 MM HG: CPT | Performed by: NURSE PRACTITIONER

## 2024-07-17 PROCEDURE — 3078F DIAST BP <80 MM HG: CPT | Performed by: NURSE PRACTITIONER

## 2024-07-17 PROCEDURE — 99213 OFFICE O/P EST LOW 20 MIN: CPT | Performed by: NURSE PRACTITIONER

## 2024-07-17 PROCEDURE — 99214 OFFICE O/P EST MOD 30 MIN: CPT | Performed by: NURSE PRACTITIONER

## 2024-07-17 RX ORDER — CHOLECALCIFEROL (VITAMIN D3) 25 MCG
1 TABLET ORAL
COMMUNITY

## 2024-07-17 ASSESSMENT — ENCOUNTER SYMPTOMS
WEAKNESS: 0
COUGH: 0
CLAUDICATION: 0
NAUSEA: 0
SHORTNESS OF BREATH: 0
WHEEZING: 0
SPUTUM PRODUCTION: 0
VOMITING: 0
BLURRED VISION: 1
ORTHOPNEA: 0
PND: 0
HEMOPTYSIS: 0
PALPITATIONS: 0
DIZZINESS: 0

## 2024-07-24 ENCOUNTER — TELEPHONE (OUTPATIENT)
Dept: CARDIOLOGY | Facility: MEDICAL CENTER | Age: 74
End: 2024-07-24
Payer: MEDICARE

## 2025-01-17 DIAGNOSIS — N18.6 END STAGE RENAL DISEASE ON DIALYSIS (HCC): ICD-10-CM

## 2025-01-17 DIAGNOSIS — Z95.5 STATUS POST CORONARY ARTERY STENT PLACEMENT: ICD-10-CM

## 2025-01-17 DIAGNOSIS — Z99.2 END STAGE RENAL DISEASE ON DIALYSIS (HCC): ICD-10-CM

## 2025-01-17 DIAGNOSIS — E11.22 TYPE 2 DIABETES MELLITUS WITH STAGE 3B CHRONIC KIDNEY DISEASE, WITHOUT LONG-TERM CURRENT USE OF INSULIN (HCC): ICD-10-CM

## 2025-01-17 DIAGNOSIS — N18.32 TYPE 2 DIABETES MELLITUS WITH STAGE 3B CHRONIC KIDNEY DISEASE, WITHOUT LONG-TERM CURRENT USE OF INSULIN (HCC): ICD-10-CM

## 2025-01-17 DIAGNOSIS — I21.4 NSTEMI (NON-ST ELEVATED MYOCARDIAL INFARCTION) (HCC): ICD-10-CM

## 2025-01-17 RX ORDER — ATORVASTATIN CALCIUM 80 MG/1
80 TABLET, FILM COATED ORAL EVERY EVENING
Qty: 90 TABLET | Refills: 1 | Status: SHIPPED | OUTPATIENT
Start: 2025-01-17

## 2025-01-17 RX ORDER — CLOPIDOGREL BISULFATE 75 MG/1
75 TABLET ORAL DAILY
Qty: 90 TABLET | Refills: 1 | Status: SHIPPED | OUTPATIENT
Start: 2025-01-17

## 2025-08-20 ENCOUNTER — TELEPHONE (OUTPATIENT)
Dept: CARDIOLOGY | Facility: MEDICAL CENTER | Age: 75
End: 2025-08-20
Payer: MEDICAID

## 2025-08-20 DIAGNOSIS — Z13.220 SCREENING CHOLESTEROL LEVEL: Primary | ICD-10-CM

## (undated) DEVICE — GLOVE BIOGEL SZ 6 PF LATEX - (50EA/BX 4BX/CA)

## (undated) DEVICE — LACTATED RINGERS INJ 1000 ML - (14EA/CA 60CA/PF)

## (undated) DEVICE — SYRINGE NON SAFETY 10 CC 20 GA X 1-1/2 IN (100/BX 4BX/CA)

## (undated) DEVICE — VESSELOOP MINI BLUE STERILE - SURG-I-LOOP (10EA/BX)

## (undated) DEVICE — TOWELS CLOTH SURGICAL - (4/PK 20PK/CA)

## (undated) DEVICE — SPONGE GAUZESTER 4 X 4 4PLY - (128PK/CA)

## (undated) DEVICE — SET EXTENSION WITH 2 PORTS (48EA/CA) ***PART #2C8610 IS A SUBSTITUTE*****

## (undated) DEVICE — SUTURE 3-0 VICRYL PLUS SH - 8X 18 INCH (12/BX)

## (undated) DEVICE — SODIUM CHL IRRIGATION 0.9% 1000ML (12EA/CA)

## (undated) DEVICE — GOWN WARMING STANDARD FLEX - (30/CA)

## (undated) DEVICE — SHIELD OPTH AL GRTR CVR FOX (50EA/BX)

## (undated) DEVICE — SUTURE GENERAL

## (undated) DEVICE — SPONGE GAUZE NON-STERILE 4X4 - (2000/CA 10PK/CA)

## (undated) DEVICE — SET LEADWIRE 5 LEAD BEDSIDE DISPOSABLE ECG (1SET OF 5/EA)

## (undated) DEVICE — SENSOR OXIMETER ADULT SPO2 RD SET (20EA/BX)

## (undated) DEVICE — TOWEL STOP TIMEOUT SAFETY FLAG (40EA/CA)

## (undated) DEVICE — COVER CIV-FLEX TRANSDUCER - (24/BX)

## (undated) DEVICE — CLIP MED INTNL HRZN TI ESCP - (25/BX)

## (undated) DEVICE — SUTURE 5-0 VICRYL D/A S-14 18 (12PK/BX)"

## (undated) DEVICE — MASK OXYGEN VNYL ADLT MED CONC WITH 7 FOOT TUBING  - (50EA/CA)

## (undated) DEVICE — CLOSURE SKIN STRIP 1/2 X 4 IN - (STERI STRIP) (50/BX 4BX/CA)

## (undated) DEVICE — WATER IRRIGATION STERILE 1000ML (12EA/CA)

## (undated) DEVICE — GAUZE FLUFF STERILE 2-PLY 36 X 36 (100EA/CA)

## (undated) DEVICE — ELECTRODE DUAL RETURN W/ CORD - (50/PK)

## (undated) DEVICE — CHLORAPREP 26 ML APPLICATOR - ORANGE TINT(25/CA)

## (undated) DEVICE — SUTURE 0 SILK 12 X 18 (36PK/BX)

## (undated) DEVICE — GLOVE BIOGEL PI INDICATOR SZ 7.5 SURGICAL PF LF -(50/BX 4BX/CA)

## (undated) DEVICE — COVER LIGHT HANDLE ALC PLUS DISP (18EA/BX)

## (undated) DEVICE — TUBING CLEARLINK DUO-VENT - C-FLO (48EA/CA)

## (undated) DEVICE — CANISTER SUCTION 3000ML MECHANICAL FILTER AUTO SHUTOFF MEDI-VAC NONSTERILE LF DISP  (40EA/CA)

## (undated) DEVICE — SUCTION INSTRUMENT YANKAUER BULBOUS TIP W/O VENT (50EA/CA)

## (undated) DEVICE — SUTURE 4-0 30CM STRATAFIX SPIRAL PS-2 (12EA/BX)

## (undated) DEVICE — SUTURE 6-0 PROLENE BV-1 D/A 24 (36PK/BX)"

## (undated) DEVICE — DECANTER FLD BLS - (50/CA)

## (undated) DEVICE — PAD PREP 24 X 48 CUFFED - (100/CA)

## (undated) DEVICE — CANISTER SUCTION RIGID RED 1500CC (40EA/CA)

## (undated) DEVICE — CLIP SM INTNL HRZN TI ESCP LGT - (24EA/PK 25PK/BX)

## (undated) DEVICE — SOLUTION PREP PVP IODINE 3/4 OZ POUCH PACKET CONTAINER STERILE LATEX FREE

## (undated) DEVICE — SUTURE 4-0 SILK 12 X 18 INCH - (36/BX)

## (undated) DEVICE — TUBE CONNECTING SUCTION - CLEAR PLASTIC STERILE 72 IN (50EA/CA)

## (undated) DEVICE — GLOVE BIOGEL SZ 8 SURGICAL PF LTX - (50PR/BX 4BX/CA)

## (undated) DEVICE — KIT  I.V. START (100EA/CA)

## (undated) DEVICE — DRAPE LOWER EXTREMETY - (6/CA)

## (undated) DEVICE — MASK, LARYNGEAL AIRWAY #4

## (undated) DEVICE — SLEEVE VASO CALF MED - (10PR/CA)

## (undated) DEVICE — STERI STRIP COMPOUND BENZOIN - TINCTURE 0.6ML WITH APPLICATOR (40EA/BX)

## (undated) DEVICE — DRAPE LARGE 3 QUARTER - (20/CA)

## (undated) DEVICE — GOWN SURGEONS X-LARGE - DISP. (30/CA)

## (undated) DEVICE — BLADE SURGICAL #11 - (50/BX)

## (undated) DEVICE — CANNULA O2 COMFORT SOFT EAR ADULT 7 FT TUBING (50/CA)

## (undated) DEVICE — GLOVE BIOGEL PI INDICATOR SZ 7.0 SURGICAL PF LF - (50/BX 4BX/CA)

## (undated) DEVICE — PACK AV FISTULA (4EA/CA)

## (undated) DEVICE — SUTURE CV

## (undated) DEVICE — CANNULA SUB-TENONS ANESTH. 1.1X25MM 19GAX1IN (10EA/SP)

## (undated) DEVICE — SYRINGE 30 ML LL (56/BX)

## (undated) DEVICE — GLOVE SZ 7 BIOGEL PI MICRO - PF LF (50PR/BX 4BX/CA)

## (undated) DEVICE — SUTURE 4-0 SILK G-3 (12EA/BX)

## (undated) DEVICE — SUTURE 6-0 PLAIN GUT G-1 D/A 18 (12PK/BX)"

## (undated) DEVICE — GLOVE LATEX POWDER-FREE BIOGEL SZ 7 (50PR/BX 4BX/CA)

## (undated) DEVICE — SLEEVE, VASO, THIGH, MED

## (undated) DEVICE — CONTAINER, SPECIMEN, STERILE

## (undated) DEVICE — SOD. CHL. INJ. 0.9% 250 ML - (36/CA 50CA/PF)

## (undated) DEVICE — DRESSING TRANSPARENT FILM TEGADERM 4 X 4.75" (50EA/BX)"

## (undated) DEVICE — SODIUM CHL. INJ. 0.9% 500ML (24EA/CA 50CA/PF)

## (undated) DEVICE — PACK KO - (3/CA)

## (undated) DEVICE — ADHESIVE MASTISOL - (48/BX)

## (undated) DEVICE — PACK LOWER EXTREMITY - (2/CA)

## (undated) DEVICE — GOWN SURGEONS LARGE - (32/CA)